# Patient Record
Sex: FEMALE | Race: ASIAN | Employment: UNEMPLOYED | URBAN - METROPOLITAN AREA
[De-identification: names, ages, dates, MRNs, and addresses within clinical notes are randomized per-mention and may not be internally consistent; named-entity substitution may affect disease eponyms.]

---

## 2021-01-17 ENCOUNTER — HOSPITAL ENCOUNTER (EMERGENCY)
Age: 26
Discharge: HOME | End: 2021-01-17
Attending: FAMILY MEDICINE
Payer: COMMERCIAL

## 2021-01-17 VITALS
OXYGEN SATURATION: 99 % | BODY MASS INDEX: 23.6 KG/M2 | TEMPERATURE: 98.1 F | HEIGHT: 61 IN | HEART RATE: 92 BPM | WEIGHT: 125 LBS | SYSTOLIC BLOOD PRESSURE: 110 MMHG | RESPIRATION RATE: 16 BRPM | DIASTOLIC BLOOD PRESSURE: 69 MMHG

## 2021-01-17 DIAGNOSIS — G44.209 ACUTE NON INTRACTABLE TENSION-TYPE HEADACHE: Primary | ICD-10-CM

## 2021-01-17 LAB
APPEARANCE UR: CLEAR
BACTERIA URNS QL MICRO: NEGATIVE /HPF
BILIRUB UR QL: NEGATIVE
COLOR UR: ABNORMAL
GLUCOSE UR STRIP.AUTO-MCNC: NEGATIVE MG/DL
HCG UR QL: NEGATIVE
HGB UR QL STRIP: NEGATIVE
KETONES UR QL STRIP.AUTO: NEGATIVE MG/DL
LEUKOCYTE ESTERASE UR QL STRIP.AUTO: NEGATIVE
NITRITE UR QL STRIP.AUTO: NEGATIVE
PH UR STRIP: 5 [PH] (ref 5–8)
PROT UR STRIP-MCNC: NEGATIVE MG/DL
RBC #/AREA URNS HPF: ABNORMAL /HPF (ref 0–5)
SP GR UR REFRACTOMETRY: 1 (ref 1–1.03)
UROBILINOGEN UR QL STRIP.AUTO: 0.1 EU/DL (ref 0.2–1)
WBC URNS QL MICRO: ABNORMAL /HPF (ref 0–4)

## 2021-01-17 PROCEDURE — 81025 URINE PREGNANCY TEST: CPT

## 2021-01-17 PROCEDURE — 96375 TX/PRO/DX INJ NEW DRUG ADDON: CPT

## 2021-01-17 PROCEDURE — 81003 URINALYSIS AUTO W/O SCOPE: CPT

## 2021-01-17 PROCEDURE — 99283 EMERGENCY DEPT VISIT LOW MDM: CPT

## 2021-01-17 PROCEDURE — 74011250636 HC RX REV CODE- 250/636: Performed by: FAMILY MEDICINE

## 2021-01-17 PROCEDURE — 96374 THER/PROPH/DIAG INJ IV PUSH: CPT

## 2021-01-17 RX ORDER — DIPHENHYDRAMINE HCL 25 MG
25 CAPSULE ORAL
Qty: 10 CAP | Refills: 0 | Status: SHIPPED | OUTPATIENT
Start: 2021-01-17 | End: 2021-01-27

## 2021-01-17 RX ORDER — ONDANSETRON 2 MG/ML
4 INJECTION INTRAMUSCULAR; INTRAVENOUS
Status: COMPLETED | OUTPATIENT
Start: 2021-01-17 | End: 2021-01-17

## 2021-01-17 RX ORDER — KETOROLAC TROMETHAMINE 15 MG/ML
15 INJECTION, SOLUTION INTRAMUSCULAR; INTRAVENOUS ONCE
Status: COMPLETED | OUTPATIENT
Start: 2021-01-17 | End: 2021-01-17

## 2021-01-17 RX ORDER — PROCHLORPERAZINE EDISYLATE 5 MG/ML
10 INJECTION INTRAMUSCULAR; INTRAVENOUS
Status: COMPLETED | OUTPATIENT
Start: 2021-01-17 | End: 2021-01-17

## 2021-01-17 RX ORDER — IBUPROFEN 800 MG/1
800 TABLET ORAL
Qty: 20 TAB | Refills: 0 | Status: SHIPPED | OUTPATIENT
Start: 2021-01-17 | End: 2021-01-24

## 2021-01-17 RX ORDER — DIPHENHYDRAMINE HYDROCHLORIDE 50 MG/ML
25 INJECTION, SOLUTION INTRAMUSCULAR; INTRAVENOUS ONCE
Status: COMPLETED | OUTPATIENT
Start: 2021-01-17 | End: 2021-01-17

## 2021-01-17 RX ADMIN — ONDANSETRON 4 MG: 2 INJECTION INTRAMUSCULAR; INTRAVENOUS at 22:29

## 2021-01-17 RX ADMIN — DIPHENHYDRAMINE HYDROCHLORIDE 25 MG: 50 INJECTION, SOLUTION INTRAMUSCULAR; INTRAVENOUS at 22:28

## 2021-01-17 RX ADMIN — PROCHLORPERAZINE EDISYLATE 10 MG: 5 INJECTION INTRAMUSCULAR; INTRAVENOUS at 22:29

## 2021-01-17 RX ADMIN — SODIUM CHLORIDE 1000 ML: 9 INJECTION, SOLUTION INTRAVENOUS at 22:29

## 2021-01-17 RX ADMIN — KETOROLAC TROMETHAMINE 15 MG: 15 INJECTION, SOLUTION INTRAMUSCULAR; INTRAVENOUS at 22:28

## 2021-01-18 NOTE — ED TRIAGE NOTES
Patient arrives with sister, Luís Ackerman, who is translating from [place] to [place] for her; although, the patient is responding appropriately to questions in [place]. Complaining headache x1 year, nausea, malaise and difficulty sleeping. LMP 1/3/21.

## 2021-01-18 NOTE — DISCHARGE INSTRUCTIONS
Thank you! Thank you for allowing me to care for you in the emergency department. I sincerely hope that you are satisfied with your visit today. It is my goal to provide you with excellent care. Below you will find a list of your labs and imaging from your visit today. Should you have any questions regarding these results please do not hesitate to call the emergency department. Labs -     Recent Results (from the past 12 hour(s))   HCG URINE, QL    Collection Time: 01/17/21 10:05 PM   Result Value Ref Range    HCG urine, QL Negative Negative     URINALYSIS W/ RFLX MICROSCOPIC    Collection Time: 01/17/21 10:18 PM   Result Value Ref Range    Color Yellow/Straw      Appearance Clear Clear      Specific gravity 1.005 1.003 - 1.030      pH (UA) 5.0 5.0 - 8.0      Protein Negative Negative mg/dL    Glucose Negative Negative mg/dL    Ketone Negative Negative mg/dL    Bilirubin Negative Negative      Blood Negative Negative      Urobilinogen 0.1 (L) 0.2 - 1.0 EU/dL    Nitrites Negative Negative      Leukocyte Esterase Negative Negative      WBC 0-4 0 - 4 /hpf    RBC 0-5 0 - 5 /hpf    Bacteria Negative Negative /hpf       Radiologic Studies -   No orders to display     CT Results  (Last 48 hours)      None          CXR Results  (Last 48 hours)      None               If you feel that you have not received excellent quality care or timely care, please ask to speak to the nurse manager. Please choose us in the future for your continued health care needs. ------------------------------------------------------------------------------------------------------------  The exam and treatment you received in the Emergency Department were for an urgent problem and are not intended as complete care. It is important that you follow-up with a doctor, nurse practitioner, or physician assistant to:  (1) confirm your diagnosis,  (2) re-evaluation of changes in your illness and treatment, and  (3) for ongoing care.   If your symptoms become worse or you do not improve as expected and you are unable to reach your usual health care provider, you should return to the Emergency Department. We are available 24 hours a day. Please take your discharge instructions with you when you go to your follow-up appointment. If you have any problem arranging a follow-up appointment, contact the Emergency Department immediately. If a prescription has been provided, please have it filled as soon as possible to prevent a delay in treatment. Read the entire medication instruction sheet provided to you by the pharmacy. If you have any questions or reservations about taking the medication due to side effects or interactions with other medications, please call your primary care physician or contact the ER to speak with the charge nurse. Make an appointment with your family doctor or the physician you were referred to for follow-up of this visit as instructed on your discharge paperwork, as this is a mandatory follow-up. Return to the ER if you are unable to be seen or if you are unable to be seen in a timely manner. If you have any problem arranging the follow-up visit, contact the Emergency Department immediately.

## 2021-01-18 NOTE — ED PROVIDER NOTES
EMERGENCY DEPARTMENT HISTORY AND PHYSICAL EXAM      Date: 1/17/2021  Patient Name: Humza Paniagua    History of Presenting Illness     Chief Complaint   Patient presents with    Headache    Nausea       History Provided By: Patient and patient's sister    HPI: Humza Paniagua, 23 y.o. female with a past medical history significant as documented below presents to the ED with cc of headache and nausea. Onset 1 week. Patient describing bandlike headache across the front forehead that has been constant and stable since onset. Is nonradiating. She has taken OTC ibuprofen every night is not providing relief. She had a year of on and off headaches with sensitivity to light and sound and nausea but no vomiting. The headache today has been the same as previous headaches however has lasted longer than normal.  No head injuries, LOC, dizziness, blurred vision, ringing in the ears, neck pain or stiffness, dyspnea, sore throat, nasal congestion or sinus pressure, earache, chest pain, palpitations, and all other symptoms are negative. She has never had her headaches evaluated by a neurologist or physician in the past.  She admits she has been feeling a lot more stress lately since she moved to this area and is having difficulty sleeping. He has been able to eat and drink without difficulty or reducing vomiting, last meal was this morning. No thoughts of hurting herself or others. There are no other complaints, changes, or physical findings at this time. PCP: None    No current facility-administered medications on file prior to encounter. No current outpatient medications on file prior to encounter. Past History     Past Medical History:  No past medical history on file. Past Surgical History:  No past surgical history on file. Family History:  No family history on file.     Social History:  Social History     Tobacco Use    Smoking status: Not on file   Substance Use Topics    Alcohol use: Not on file  Drug use: Not on file       Allergies:  No Known Allergies      Review of Systems     Review of Systems   Constitutional: Negative for chills, fatigue and fever. HENT: Negative for congestion and sore throat. Eyes: Negative for photophobia and visual disturbance. Respiratory: Negative for cough and shortness of breath. Cardiovascular: Negative for chest pain and palpitations. Gastrointestinal: Negative for nausea and vomiting. Genitourinary: Negative for dysuria and flank pain. Musculoskeletal: Negative for arthralgias, back pain and myalgias. Skin: Negative for rash and wound. Allergic/Immunologic: Negative for environmental allergies and food allergies. Neurological: Positive for headaches. Negative for dizziness, tremors, syncope, facial asymmetry, speech difficulty, weakness and light-headedness. All other systems reviewed and are negative. Physical Exam     Physical Exam  Vitals signs and nursing note reviewed. Constitutional:       Appearance: Normal appearance. She is normal weight. HENT:      Head: Normocephalic and atraumatic. Right Ear: Tympanic membrane, ear canal and external ear normal.      Left Ear: Tympanic membrane, ear canal and external ear normal.      Nose: Nose normal.      Mouth/Throat:      Mouth: Mucous membranes are moist.   Eyes:      Extraocular Movements: Extraocular movements intact. Conjunctiva/sclera: Conjunctivae normal.   Neck:      Musculoskeletal: Normal range of motion and neck supple. No muscular tenderness. Cardiovascular:      Rate and Rhythm: Normal rate and regular rhythm. Pulses: Normal pulses. Heart sounds: Normal heart sounds. Pulmonary:      Effort: Pulmonary effort is normal.      Breath sounds: Normal breath sounds. Musculoskeletal: Normal range of motion. Right lower leg: No edema. Left lower leg: No edema. Lymphadenopathy:      Cervical: No cervical adenopathy.    Skin:     General: Skin is warm.      Capillary Refill: Capillary refill takes less than 2 seconds. Neurological:      General: No focal deficit present. Mental Status: She is alert and oriented to person, place, and time. Cranial Nerves: No cranial nerve deficit. Sensory: No sensory deficit. Motor: No weakness. Gait: Gait normal.   Psychiatric:         Mood and Affect: Mood normal.         Behavior: Behavior normal.         Lab and Diagnostic Study Results     Labs -     Recent Results (from the past 12 hour(s))   HCG URINE, QL    Collection Time: 01/17/21 10:05 PM   Result Value Ref Range    HCG urine, QL Negative Negative     URINALYSIS W/ RFLX MICROSCOPIC    Collection Time: 01/17/21 10:18 PM   Result Value Ref Range    Color Yellow/Straw      Appearance Clear Clear      Specific gravity 1.005 1.003 - 1.030      pH (UA) 5.0 5.0 - 8.0      Protein Negative Negative mg/dL    Glucose Negative Negative mg/dL    Ketone Negative Negative mg/dL    Bilirubin Negative Negative      Blood Negative Negative      Urobilinogen 0.1 (L) 0.2 - 1.0 EU/dL    Nitrites Negative Negative      Leukocyte Esterase Negative Negative      WBC 0-4 0 - 4 /hpf    RBC 0-5 0 - 5 /hpf    Bacteria Negative Negative /hpf       Radiologic Studies -   @lastxrresult@  CT Results  (Last 48 hours)    None        CXR Results  (Last 48 hours)    None            Medical Decision Making   - I am the first provider for this patient. - I reviewed the vital signs, available nursing notes, past medical history, past surgical history, family history and social history. - Initial assessment performed. The patients presenting problems have been discussed, and they are in agreement with the care plan formulated and outlined with them. I have encouraged them to ask questions as they arise throughout their visit. Vital Signs-Reviewed the patient's vital signs.   Patient Vitals for the past 12 hrs:   Temp Pulse Resp BP SpO2   01/17/21 2243  92 16 110/69 99 %   01/17/21 2203 98.1 °F (36.7 °C) 92 16 112/78 99 %       Records Reviewed: Nursing Notes    ED Course:          Provider Notes (Medical Decision Making):     MDM  Number of Diagnoses or Management Options  Acute non intractable tension-type headache  Diagnosis management comments: Headache resolved with IV fluids, and headache cocktail treatment. Per H&P, is likely patient had a tension headache. Patient is stable with no marked toxicity or distress. No significant findings on physical exam. Results reviewed with the patient. Recommended to follow-up with the primary care doctor to further get treatment for insomnia and general stress in the meantime to take Benadryl to help with sleep. All questions were answered and there are no apparent barriers to comprehension or communication. The patient verbalizes agreement with the diagnosis, treatment plan, and understanding of the follow-up instructions. The patient is appropriate for discharge; leaves the Emergency Department walking with a stable gait. Patient understands to return to the ED in 12-24 hours for any new or worsening symptoms. Disposition   Disposition: Condition stable  DC- Adult Discharges: All of the diagnostic tests were reviewed and questions answered. Diagnosis, care plan and treatment options were discussed. The patient understands the instructions and will follow up as directed. The patients results have been reviewed with them. They have been counseled regarding their diagnosis. The patient verbally convey understanding and agreement of the signs, symptoms, diagnosis, treatment and prognosis and additionally agrees to follow up as recommended with their PCP in 24 - 48 hours. They also agree with the care-plan and convey that all of their questions have been answered.   I have also put together some discharge instructions for them that include: 1) educational information regarding their diagnosis, 2) how to care for their diagnosis at home, as well a 3) list of reasons why they would want to return to the ED prior to their follow-up appointment, should their condition change. DISCHARGE PLAN:  1. There are no discharge medications for this patient. 2.   Follow-up Information     Follow up With Specialties Details Why Dyke Buerger, MD [facility] Schedule an appointment as soon as possible for a visit in 3 days  [address]  [phone]          3. Return to ED if worse   4. Current Discharge Medication List      START taking these medications    Details   diphenhydrAMINE (BenadryL) 25 mg capsule Take 1 Cap by mouth nightly as needed for Sleep for up to 10 days. Qty: 10 Cap, Refills: 0      ibuprofen (MOTRIN) 800 mg tablet Take 1 Tab by mouth every six (6) hours as needed for Pain for up to 7 days. Qty: 20 Tab, Refills: 0               Diagnosis     Clinical Impression:   1. Acute non intractable tension-type headache        Attestations:    Lauren Mandel, DO    Please note that this dictation was completed with [employer], the computer voice recognition software. Quite often unanticipated grammatical, syntax, homophones, and other interpretive errors are inadvertently transcribed by the computer software. Please disregard these errors. Please excuse any errors that have escaped final proofreading. Thank you.

## 2023-08-15 ENCOUNTER — INITIAL PRENATAL (OUTPATIENT)
Age: 28
End: 2023-08-15

## 2023-08-15 VITALS
WEIGHT: 147.31 LBS | BODY MASS INDEX: 27.81 KG/M2 | RESPIRATION RATE: 16 BRPM | DIASTOLIC BLOOD PRESSURE: 70 MMHG | SYSTOLIC BLOOD PRESSURE: 119 MMHG | HEIGHT: 61 IN | TEMPERATURE: 97.8 F | OXYGEN SATURATION: 100 % | HEART RATE: 103 BPM

## 2023-08-15 DIAGNOSIS — Z34.90 PRENATAL CARE, ANTEPARTUM: Primary | ICD-10-CM

## 2023-08-15 DIAGNOSIS — Z34.90 PRENATAL CARE, ANTEPARTUM: ICD-10-CM

## 2023-08-15 DIAGNOSIS — N89.8 VAGINAL DISCHARGE: ICD-10-CM

## 2023-08-15 DIAGNOSIS — Z3A.18 18 WEEKS GESTATION OF PREGNANCY: ICD-10-CM

## 2023-08-15 RX ORDER — PNV NO.95/FERROUS FUM/FOLIC AC 28MG-0.8MG
1 TABLET ORAL DAILY
Qty: 90 TABLET | Refills: 5 | Status: SHIPPED | OUTPATIENT
Start: 2023-08-15

## 2023-08-16 LAB
25(OH)D3+25(OH)D2 SERPL-MCNC: 13 NG/ML (ref 30–100)
ABO GROUP BLD: NORMAL
BASOPHILS # BLD AUTO: 0 X10E3/UL (ref 0–0.2)
BASOPHILS NFR BLD AUTO: 0 %
BLD GP AB SCN SERPL QL: NEGATIVE
EOSINOPHIL # BLD AUTO: 0.3 X10E3/UL (ref 0–0.4)
EOSINOPHIL NFR BLD AUTO: 3 %
ERYTHROCYTE [DISTWIDTH] IN BLOOD BY AUTOMATED COUNT: 13.1 % (ref 11.7–15.4)
HBV SURFACE AG SERPL QL IA: NEGATIVE
HCT VFR BLD AUTO: 33 % (ref 34–46.6)
HCV IGG SERPL QL IA: NON REACTIVE
HGB BLD-MCNC: 11.1 G/DL (ref 11.1–15.9)
HIV 1+2 AB+HIV1 P24 AG SERPL QL IA: NON REACTIVE
IMM GRANULOCYTES # BLD AUTO: 0 X10E3/UL (ref 0–0.1)
IMM GRANULOCYTES NFR BLD AUTO: 0 %
LYMPHOCYTES # BLD AUTO: 2.1 X10E3/UL (ref 0.7–3.1)
LYMPHOCYTES NFR BLD AUTO: 20 %
MCH RBC QN AUTO: 29.1 PG (ref 26.6–33)
MCHC RBC AUTO-ENTMCNC: 33.6 G/DL (ref 31.5–35.7)
MCV RBC AUTO: 86 FL (ref 79–97)
MONOCYTES # BLD AUTO: 0.6 X10E3/UL (ref 0.1–0.9)
MONOCYTES NFR BLD AUTO: 6 %
NEUTROPHILS # BLD AUTO: 7.2 X10E3/UL (ref 1.4–7)
NEUTROPHILS NFR BLD AUTO: 71 %
PLATELET # BLD AUTO: 270 X10E3/UL (ref 150–450)
RBC # BLD AUTO: 3.82 X10E6/UL (ref 3.77–5.28)
RH BLD: POSITIVE
TREPONEMA PALLIDUM IGG+IGM AB [PRESENCE] IN SERUM OR PLASMA BY IMMUNOASSAY: NON REACTIVE
TSH SERPL DL<=0.005 MIU/L-ACNC: 0.69 UIU/ML (ref 0.45–4.5)
WBC # BLD AUTO: 10.2 X10E3/UL (ref 3.4–10.8)

## 2023-08-17 LAB
2ND TRIMESTER 4 SCREEN SERPL-IMP: NORMAL
AFP ADJ MOM SERPL: 0.77
AFP SERPL-MCNC: 35.1 NG/ML
AGE AT DELIVERY: 26.1 YR
FET TS 18 RISK FROM MAT AGE: NORMAL
FET TS 21 RISK FROM MAT AGE: 977
GA METHOD: NORMAL
GA: 18 WEEKS
HCG ADJ MOM SERPL: 0.85
HCG SERPL-ACNC: NORMAL MIU/ML
HGB A MFR BLD ELPH: 96.9 % (ref 96.4–98.8)
HGB A2 MFR BLD ELPH: 3.1 % (ref 1.8–3.2)
HGB F MFR BLD ELPH: 0 % (ref 0–2)
HGB FRACT BLD-IMP: NORMAL
HGB S MFR BLD ELPH: 0 %
IDDM PATIENT QL: NO
INHIBIN A ADJ MOM SERPL: 0.67
INHIBIN A SERPL-MCNC: 103.9 PG/ML
MULTIPLE PREGNANCY: NO
NEURAL TUBE DEFECT RISK FETUS: NORMAL %
SERVICE CMNT-IMP: NORMAL
TS 18 RISK FETUS: NORMAL
TS 21 RISK FETUS: 9026
U ESTRIOL ADJ MOM SERPL: 0.88
U ESTRIOL SERPL-MCNC: 1.28 NG/ML

## 2023-08-18 LAB
A VAGINAE DNA VAG QL NAA+PROBE: ABNORMAL SCORE
BVAB2 DNA VAG QL NAA+PROBE: ABNORMAL SCORE
C ALBICANS DNA VAG QL NAA+PROBE: NEGATIVE
C GLABRATA DNA VAG QL NAA+PROBE: NEGATIVE
C TRACH DNA VAG QL NAA+PROBE: NEGATIVE
MEGA1 DNA VAG QL NAA+PROBE: ABNORMAL SCORE
N GONORRHOEA DNA VAG QL NAA+PROBE: NEGATIVE
T VAGINALIS DNA VAG QL NAA+PROBE: NEGATIVE

## 2023-08-21 LAB
CFTR MUT ANL BLD/T: NORMAL
GENE DIS ANL CARRIER INTERP-IMP: NORMAL

## 2023-08-22 LAB
CMV IGG SERPL IA-ACNC: >10 U/ML (ref 0–0.59)
CMV IGM SERPL IA-ACNC: <30 AU/ML (ref 0–29.9)
RUBV IGG SERPL IA-ACNC: 10 INDEX

## 2023-08-23 ENCOUNTER — HOSPITAL ENCOUNTER (OUTPATIENT)
Facility: HOSPITAL | Age: 28
Discharge: HOME | End: 2023-08-26
Attending: OBSTETRICS & GYNECOLOGY
Payer: COMMERCIAL

## 2023-08-23 DIAGNOSIS — N89.8 VAGINAL DISCHARGE: ICD-10-CM

## 2023-08-23 LAB
CFDNA.FET/CFDNA.TOTAL SFR FETUS: NORMAL %
CITATION REF LAB TEST: NORMAL
FET 13+18+21+X+Y ANEUP PLAS.CFDNA: NEGATIVE
FET CHR 21 TS PLAS.CFDNA QL: NEGATIVE
FET MS X RISK WBC.DNA+CFDNA QL: NOT DETECTED
FET SEX PLAS.CFDNA DOSAGE CFDNA: NORMAL
FET TS 13 RISK PLAS.CFDNA QL: NEGATIVE
FET TS 18 RISK WBC.DNA+CFDNA QL: NEGATIVE
FET X + Y ANEUP RISK PLAS.CFDNA SEQ-IMP: NOT DETECTED
GA EST FROM CONCEPTION DATE: NORMAL D
GESTATIONAL AGE > OR = 9 WEEKS: YES
LAB DIRECTOR NAME PROVIDER: NORMAL
LAB DIRECTOR NAME PROVIDER: NORMAL
LABORATORY COMMENT REPORT: NORMAL
LIMITATIONS OF THE TEST: NORMAL
Lab: NORMAL
NEGATIVE PREDICTIVE VALUE: NORMAL
PERFORMANCE CHARACTERISTICS: NORMAL
POSITIVE PREDICTIVE VALUE: NORMAL
REF LAB TEST METHOD: NORMAL
TEST PERFORMANCE INFO SPEC: NORMAL

## 2023-08-23 PROCEDURE — 76805 OB US >/= 14 WKS SNGL FETUS: CPT

## 2023-08-25 LAB
ABDOMINAL CIRCUMFERENCE: 13.5 CM
ABDOMINAL CIRCUMFERENCE: 13.54 CM
ABDOMINAL CIRCUMFERENCE: 13.58 CM
BIPARIETAL DIAMETER: 4.55 CM
BIPARIETAL DIAMETER: 4.55 CM
HEAD CIRCUMFERENCE: 16.21 CM
HEAD CIRCUMFERENCE: 16.21 CM

## 2023-09-14 SDOH — ECONOMIC STABILITY: INCOME INSECURITY: HOW HARD IS IT FOR YOU TO PAY FOR THE VERY BASICS LIKE FOOD, HOUSING, MEDICAL CARE, AND HEATING?: PATIENT DECLINED

## 2023-09-14 SDOH — ECONOMIC STABILITY: HOUSING INSECURITY
IN THE LAST 12 MONTHS, WAS THERE A TIME WHEN YOU DID NOT HAVE A STEADY PLACE TO SLEEP OR SLEPT IN A SHELTER (INCLUDING NOW)?: NO

## 2023-09-14 SDOH — ECONOMIC STABILITY: FOOD INSECURITY: WITHIN THE PAST 12 MONTHS, YOU WORRIED THAT YOUR FOOD WOULD RUN OUT BEFORE YOU GOT MONEY TO BUY MORE.: PATIENT DECLINED

## 2023-09-14 SDOH — ECONOMIC STABILITY: TRANSPORTATION INSECURITY
IN THE PAST 12 MONTHS, HAS LACK OF TRANSPORTATION KEPT YOU FROM MEETINGS, WORK, OR FROM GETTING THINGS NEEDED FOR DAILY LIVING?: NO

## 2023-09-14 SDOH — ECONOMIC STABILITY: FOOD INSECURITY: WITHIN THE PAST 12 MONTHS, THE FOOD YOU BOUGHT JUST DIDN'T LAST AND YOU DIDN'T HAVE MONEY TO GET MORE.: PATIENT DECLINED

## 2023-09-15 ENCOUNTER — ROUTINE PRENATAL (OUTPATIENT)
Age: 28
End: 2023-09-15

## 2023-09-15 VITALS
SYSTOLIC BLOOD PRESSURE: 101 MMHG | WEIGHT: 153 LBS | DIASTOLIC BLOOD PRESSURE: 62 MMHG | BODY MASS INDEX: 28.89 KG/M2 | OXYGEN SATURATION: 100 % | HEIGHT: 61 IN | RESPIRATION RATE: 16 BRPM | HEART RATE: 94 BPM

## 2023-09-15 DIAGNOSIS — Z87.891 HISTORY OF NICOTINE VAPING: ICD-10-CM

## 2023-09-15 DIAGNOSIS — Z34.90 PRENATAL CARE, ANTEPARTUM: Primary | ICD-10-CM

## 2023-09-15 DIAGNOSIS — Z3A.22 22 WEEKS GESTATION OF PREGNANCY: ICD-10-CM

## 2023-09-15 RX ORDER — DOCUSATE SODIUM 100 MG/1
100 CAPSULE, LIQUID FILLED ORAL DAILY PRN
Qty: 60 CAPSULE | Refills: 0 | Status: SHIPPED | OUTPATIENT
Start: 2023-09-15

## 2023-10-12 ENCOUNTER — ROUTINE PRENATAL (OUTPATIENT)
Age: 28
End: 2023-10-12

## 2023-10-12 VITALS
OXYGEN SATURATION: 99 % | RESPIRATION RATE: 16 BRPM | WEIGHT: 158 LBS | SYSTOLIC BLOOD PRESSURE: 128 MMHG | DIASTOLIC BLOOD PRESSURE: 72 MMHG | HEIGHT: 61 IN | HEART RATE: 110 BPM | BODY MASS INDEX: 29.83 KG/M2

## 2023-10-12 DIAGNOSIS — Z34.90 PRENATAL CARE, ANTEPARTUM: Primary | ICD-10-CM

## 2023-10-12 DIAGNOSIS — Z87.891 HISTORY OF NICOTINE VAPING: ICD-10-CM

## 2023-10-12 DIAGNOSIS — Z3A.26 26 WEEKS GESTATION OF PREGNANCY: ICD-10-CM

## 2023-10-12 PROCEDURE — 0502F SUBSEQUENT PRENATAL CARE: CPT | Performed by: OBSTETRICS & GYNECOLOGY

## 2023-10-12 NOTE — PATIENT INSTRUCTIONS
Patient Education        Weeks 26 to 30 of Your Pregnancy: Care Instructions  You're starting your last trimester. Waylan Done probably feel your baby moving around more. Your back may ache as your body gets used to your baby's size and length. Take care of yourself, and pay attention to what your body needs. Talk to your doctor about getting the Tdap shot. It will help protect your  against whooping cough (pertussis). Also ask your doctor about flu and COVID-19 shots if you haven't had them yet. If your blood type is Rh negative, you may be given a shot of Rh immune globulin (such as RhoGAM). It can help prevent problems for your baby. You may have [place] contractions. They are single or several strong contractions without a pattern. These are practice contractions but not the start of labor. Be kind to yourself. Take breaks when you're tired. Change positions often. Don't sit for too long or stand for too long. At work, rest during breaks if you can. If you don't get breaks, talk to your doctor about writing a letter to your employer to request them. Avoid fumes, chemicals, and tobacco smoke. Be sexual if you want to. You may be interested in sex, or you may not. Everyone is different. Sex is okay unless your doctor tells you not to. Your belly can make it hard to find good positions for sex. [place] and explore. Watch for signs of  labor. These signs include:   Menstrual-like cramps. Or you may have pain or pressure in your pelvis that happens in a pattern. About 6 or more contractions in an hour (even after rest and a glass of water). A low, dull backache that doesn't go away when you change positions. An increase or change in vaginal discharge. Light vaginal bleeding or spotting. Your water breaking. Know what to do if you think you are having contractions. Drink 1 or 2 glasses of water. Lie down on your left side for at least an hour.   While on your side,

## 2023-10-23 ENCOUNTER — ROUTINE PRENATAL (OUTPATIENT)
Age: 28
End: 2023-10-23

## 2023-10-23 VITALS
RESPIRATION RATE: 18 BRPM | DIASTOLIC BLOOD PRESSURE: 68 MMHG | HEIGHT: 61 IN | WEIGHT: 161 LBS | HEART RATE: 103 BPM | OXYGEN SATURATION: 99 % | BODY MASS INDEX: 30.4 KG/M2 | SYSTOLIC BLOOD PRESSURE: 110 MMHG

## 2023-10-23 DIAGNOSIS — Z34.90 PRENATAL CARE, ANTEPARTUM: Primary | ICD-10-CM

## 2023-10-23 DIAGNOSIS — Z3A.28 28 WEEKS GESTATION OF PREGNANCY: ICD-10-CM

## 2023-10-23 PROCEDURE — 0502F SUBSEQUENT PRENATAL CARE: CPT | Performed by: OBSTETRICS & GYNECOLOGY

## 2023-10-23 NOTE — PROGRESS NOTES
Johnella Severs is a 24 y.o. female 31w0d        OB History    Para Term  AB Living   2       1 0   SAB IAB Ectopic Molar Multiple Live Births   1         0      # Outcome Date GA Lbr Bonilla/2nd Weight Sex Delivery Anes PTL Lv   2 Current            1 SAB                  Mother's Prenatal Vitals  BP: 110/68  Weight - Scale: 73 kg (161 lb)  Pulse: (!) 103      The patient was seen and evaluated. There was positive fetal movements. No contractions or leakage of fluid. Signs and symptoms of  labor as well as labor were reviewed. The S/S of Pre-Eclampsia were reviewed with the patient in detail. She is to report any of these if they occur. She currently denies any of these. The patient had her 28 week labs in process. T-Dap Vaccine (27-36 weeks): awaiting    The patient was instructed on fetal kick counts and was given a kick sheet to complete every 8 hours. She was instructed that the baby should move at a minimum of ten times within one hour after a meal. The patient was instructed to lay down on her left side twenty minutes after eating and count movements for up to one hour with a target value of ten movements. She was instructed to notify the office if she did not make that target after two attempts or if after any attempt there was less than four movements. The patient reports that the targets have been made Yes. Assessment:  1. Johnella Severs is a 24 y.o. female  2.   3. 28w0d    There are no problems to display for this patient. Diagnosis Orders   1. Prenatal care, antepartum        2. 28 weeks gestation of pregnancy                  Plan:  The patient will return to the office for her next visit in 2 weeks. See antepartum flow sheet.

## 2023-10-24 LAB
ERYTHROCYTE [DISTWIDTH] IN BLOOD BY AUTOMATED COUNT: 12.2 % (ref 11.7–15.4)
GLUCOSE 1H P 50 G GLC PO SERPL-MCNC: 151 MG/DL (ref 70–139)
HCT VFR BLD AUTO: 30.9 % (ref 34–46.6)
HGB BLD-MCNC: 10.3 G/DL (ref 11.1–15.9)
MCH RBC QN AUTO: 29.2 PG (ref 26.6–33)
MCHC RBC AUTO-ENTMCNC: 33.3 G/DL (ref 31.5–35.7)
MCV RBC AUTO: 88 FL (ref 79–97)
PLATELET # BLD AUTO: 252 X10E3/UL (ref 150–450)
RBC # BLD AUTO: 3.53 X10E6/UL (ref 3.77–5.28)
WBC # BLD AUTO: 9.9 X10E3/UL (ref 3.4–10.8)

## 2023-10-31 NOTE — PROGRESS NOTES
MARK visit  Doing well  Good fetal movement  States she has stopped vaping  No PTL symptoms  To complete glucoa

## 2023-11-06 ENCOUNTER — ROUTINE PRENATAL (OUTPATIENT)
Age: 28
End: 2023-11-06

## 2023-11-06 VITALS
BODY MASS INDEX: 31.15 KG/M2 | RESPIRATION RATE: 16 BRPM | SYSTOLIC BLOOD PRESSURE: 116 MMHG | DIASTOLIC BLOOD PRESSURE: 68 MMHG | WEIGHT: 165 LBS | HEIGHT: 61 IN

## 2023-11-06 DIAGNOSIS — O99.019 ANEMIA DURING PREGNANCY: ICD-10-CM

## 2023-11-06 DIAGNOSIS — R12 HEARTBURN: ICD-10-CM

## 2023-11-06 DIAGNOSIS — O99.810 ABNORMAL GLUCOSE AFFECTING PREGNANCY: ICD-10-CM

## 2023-11-06 DIAGNOSIS — Z34.90 PRENATAL CARE, ANTEPARTUM: Primary | ICD-10-CM

## 2023-11-06 DIAGNOSIS — Z3A.30 30 WEEKS GESTATION OF PREGNANCY: ICD-10-CM

## 2023-11-06 PROCEDURE — 0502F SUBSEQUENT PRENATAL CARE: CPT | Performed by: OBSTETRICS & GYNECOLOGY

## 2023-11-06 RX ORDER — FERROUS SULFATE 325(65) MG
325 TABLET ORAL 2 TIMES DAILY
Qty: 180 TABLET | Refills: 1 | Status: SHIPPED | OUTPATIENT
Start: 2023-11-06

## 2023-11-06 RX ORDER — OMEPRAZOLE 20 MG/1
20 CAPSULE, DELAYED RELEASE ORAL
Qty: 30 CAPSULE | Refills: 0 | Status: SHIPPED | OUTPATIENT
Start: 2023-11-06

## 2023-11-06 NOTE — PROGRESS NOTES
Charity Leonardo is a 24 y.o. female 30w0d        OB History    Para Term  AB Living   2       1 0   SAB IAB Ectopic Molar Multiple Live Births   1         0      # Outcome Date GA Lbr Bonilla/2nd Weight Sex Delivery Anes PTL Lv   2 Current            1 SAB                  Mother's Prenatal Vitals  BP: 116/68  Weight - Scale: 74.8 kg (165 lb)  Alb/Glu  Albumin: Negative  Glucose: Negative  Prenatal Fetal Information  Fetal HR: 141  Movement: Present  Presentation: Vertex      The patient was seen and evaluated. There was positive fetal movements. No contractions or leakage of fluid. Signs and symptoms of  labor as well as labor were reviewed. The S/S of Pre-Eclampsia were reviewed with the patient in detail. She is to report any of these if they occur. She currently denies any of these. The patient was instructed on fetal kick counts and was given a kick sheet to complete every 8 hours. She was instructed that the baby should move at a minimum of ten times within one hour after a meal. The patient was instructed to lay down on her left side twenty minutes after eating and count movements for up to one hour with a target value of ten movements. She was instructed to notify the office if she did not make that target after two attempts or if after any attempt there was less than four movements. The patient reports that the targets have been made Yes. Assessment:  1. Charity Leonardo is a 24 y.o. female  2.   3. 30w0d    There are no problems to display for this patient. Diagnosis Orders   1. Prenatal care, antepartum        2. Abnormal glucose affecting pregnancy  Gestational GTT, 3 HR      3. 30 weeks gestation of pregnancy        4. Heartburn  omeprazole (PRILOSEC) 20 MG delayed release capsule      5. Anemia during pregnancy  ferrous sulfate (IRON 325) 325 (65 Fe) MG tablet                Plan:  The patient will return to the office for her next visit in 2 weeks.  See antepartum

## 2023-11-11 LAB
GLUCOSE 1H P 100 G GLC PO SERPL-MCNC: 192 MG/DL (ref 70–179)
GLUCOSE 2H P 100 G GLC PO SERPL-MCNC: 186 MG/DL (ref 70–154)
GLUCOSE 3H P 100 G GLC PO SERPL-MCNC: 156 MG/DL (ref 70–139)
GLUCOSE P FAST SERPL-MCNC: 81 MG/DL (ref 70–94)
Lab: ABNORMAL

## 2023-11-20 ENCOUNTER — ROUTINE PRENATAL (OUTPATIENT)
Age: 28
End: 2023-11-20

## 2023-11-20 VITALS
DIASTOLIC BLOOD PRESSURE: 67 MMHG | TEMPERATURE: 97.8 F | SYSTOLIC BLOOD PRESSURE: 114 MMHG | WEIGHT: 167.25 LBS | OXYGEN SATURATION: 98 % | BODY MASS INDEX: 31.58 KG/M2 | HEIGHT: 61 IN | HEART RATE: 106 BPM | RESPIRATION RATE: 16 BRPM

## 2023-11-20 DIAGNOSIS — Z87.891 HISTORY OF NICOTINE VAPING: ICD-10-CM

## 2023-11-20 DIAGNOSIS — O24.419 GESTATIONAL DIABETES MELLITUS (GDM) IN THIRD TRIMESTER, GESTATIONAL DIABETES METHOD OF CONTROL UNSPECIFIED: Primary | ICD-10-CM

## 2023-11-20 DIAGNOSIS — Z3A.32 32 WEEKS GESTATION OF PREGNANCY: ICD-10-CM

## 2023-11-20 DIAGNOSIS — K59.00 CONSTIPATION, UNSPECIFIED CONSTIPATION TYPE: ICD-10-CM

## 2023-11-20 PROCEDURE — 0502F SUBSEQUENT PRENATAL CARE: CPT | Performed by: OBSTETRICS & GYNECOLOGY

## 2023-11-20 RX ORDER — DOCUSATE SODIUM 100 MG/1
100 CAPSULE, LIQUID FILLED ORAL 2 TIMES DAILY
Qty: 60 CAPSULE | Refills: 0 | Status: SHIPPED | OUTPATIENT
Start: 2023-11-20 | End: 2023-12-20

## 2023-11-22 NOTE — PROGRESS NOTES
MARK visit  Good fetal movement  GDM as patient has failed 3hr GTT  MFM referral  Precautions  Risk of GDM reviewed and discussed with patient  Precautions  Fetal kick counts

## 2023-12-12 ENCOUNTER — ROUTINE PRENATAL (OUTPATIENT)
Age: 28
End: 2023-12-12

## 2023-12-12 VITALS
BODY MASS INDEX: 32.76 KG/M2 | WEIGHT: 173.5 LBS | DIASTOLIC BLOOD PRESSURE: 72 MMHG | RESPIRATION RATE: 16 BRPM | HEIGHT: 61 IN | HEART RATE: 96 BPM | OXYGEN SATURATION: 97 % | SYSTOLIC BLOOD PRESSURE: 105 MMHG

## 2023-12-12 DIAGNOSIS — O24.419 GESTATIONAL DIABETES MELLITUS (GDM) IN THIRD TRIMESTER, GESTATIONAL DIABETES METHOD OF CONTROL UNSPECIFIED: ICD-10-CM

## 2023-12-12 DIAGNOSIS — Z34.83 PRENATAL CARE, SUBSEQUENT PREGNANCY IN THIRD TRIMESTER: Primary | ICD-10-CM

## 2023-12-12 DIAGNOSIS — Z3A.35 35 WEEKS GESTATION OF PREGNANCY: ICD-10-CM

## 2023-12-12 PROCEDURE — 0502F SUBSEQUENT PRENATAL CARE: CPT | Performed by: OBSTETRICS & GYNECOLOGY

## 2023-12-15 LAB — GP B STREP DNA SPEC QL NAA+PROBE: NEGATIVE

## 2023-12-16 LAB
BASOPHILS # BLD AUTO: 0 X10E3/UL (ref 0–0.2)
BASOPHILS NFR BLD AUTO: 0 %
EOSINOPHIL # BLD AUTO: 0.2 X10E3/UL (ref 0–0.4)
EOSINOPHIL NFR BLD AUTO: 2 %
ERYTHROCYTE [DISTWIDTH] IN BLOOD BY AUTOMATED COUNT: 12.1 % (ref 11.7–15.4)
HCT VFR BLD AUTO: 34.7 % (ref 34–46.6)
HGB BLD-MCNC: 11.2 G/DL (ref 11.1–15.9)
HIV 1+2 AB+HIV1 P24 AG SERPL QL IA: NON REACTIVE
IMM GRANULOCYTES # BLD AUTO: 0.1 X10E3/UL (ref 0–0.1)
IMM GRANULOCYTES NFR BLD AUTO: 1 %
LYMPHOCYTES # BLD AUTO: 2.2 X10E3/UL (ref 0.7–3.1)
LYMPHOCYTES NFR BLD AUTO: 24 %
MCH RBC QN AUTO: 28.1 PG (ref 26.6–33)
MCHC RBC AUTO-ENTMCNC: 32.3 G/DL (ref 31.5–35.7)
MCV RBC AUTO: 87 FL (ref 79–97)
MONOCYTES # BLD AUTO: 0.5 X10E3/UL (ref 0.1–0.9)
MONOCYTES NFR BLD AUTO: 6 %
NEUTROPHILS # BLD AUTO: 6.1 X10E3/UL (ref 1.4–7)
NEUTROPHILS NFR BLD AUTO: 67 %
PLATELET # BLD AUTO: 258 X10E3/UL (ref 150–450)
RBC # BLD AUTO: 3.98 X10E6/UL (ref 3.77–5.28)
RPR SER QL: NON REACTIVE
WBC # BLD AUTO: 9 X10E3/UL (ref 3.4–10.8)

## 2023-12-28 ENCOUNTER — ROUTINE PRENATAL (OUTPATIENT)
Age: 28
End: 2023-12-28

## 2023-12-28 VITALS
WEIGHT: 175 LBS | RESPIRATION RATE: 16 BRPM | OXYGEN SATURATION: 97 % | DIASTOLIC BLOOD PRESSURE: 69 MMHG | BODY MASS INDEX: 33.04 KG/M2 | SYSTOLIC BLOOD PRESSURE: 107 MMHG | HEIGHT: 61 IN | HEART RATE: 84 BPM

## 2023-12-28 DIAGNOSIS — Z34.83 PRENATAL CARE, SUBSEQUENT PREGNANCY IN THIRD TRIMESTER: Primary | ICD-10-CM

## 2023-12-28 DIAGNOSIS — Z3A.37 37 WEEKS GESTATION OF PREGNANCY: ICD-10-CM

## 2023-12-28 DIAGNOSIS — O24.419 GESTATIONAL DIABETES MELLITUS (GDM) IN THIRD TRIMESTER, GESTATIONAL DIABETES METHOD OF CONTROL UNSPECIFIED: ICD-10-CM

## 2023-12-28 PROCEDURE — 0502F SUBSEQUENT PRENATAL CARE: CPT | Performed by: OBSTETRICS & GYNECOLOGY

## 2023-12-28 NOTE — PROGRESS NOTES
Haily Lucero is a 34 y.o. female 44w1d        OB History    Para Term  AB Living   2       1 0   SAB IAB Ectopic Molar Multiple Live Births   1         0      # Outcome Date GA Lbr Bonilla/2nd Weight Sex Delivery Anes PTL Lv   2 Current            1 SAB                  Mother's Prenatal Vitals  BP: 107/69  Weight - Scale: 79.4 kg (175 lb)  Pulse: 84  Prenatal Fetal Information  Fetal HR: 140s  Movement: Present  Presentation: Vertex  Dil/Eff/Sta  Dilation (cm): 1  Effacement: 0  Station: [place]      The patient was seen and evaluated. There was positive fetal movements. Contractions x 1 day, no lof no bleeding. Signs and symptoms of  labor as well as labor were reviewed. The S/S of Pre-Eclampsia were reviewed with the patient in detail. She is to report any of these if they occur. The patient had her 28 week labs completed. T-Dap Vaccine (27-36 weeks): completed    The patient was instructed on fetal kick counts and was given a kick sheet to complete every 8 hours. She was instructed that the baby should move at a minimum of ten times within one hour after a meal. The patient was instructed to lay down on her left side twenty minutes after eating and count movements for up to one hour with a target value of ten movements. She was instructed to notify the office if she did not make that target after two attempts or if after any attempt there was less than four movements. The patient reports that the targets have been made Yes. Assessment:  1. Haily Lucero is a 34 y.o. female  2.    3. 37w3d    NST in the office - irregular contractions  Sent to LD for further evaluation

## 2023-12-29 NOTE — PROGRESS NOTES
MARK visit  Doing well  Good fetal movement  Fingerstick log reviewed  Precautions  MFM appt scheduled  F/u 1 week

## 2024-01-04 ENCOUNTER — ROUTINE PRENATAL (OUTPATIENT)
Age: 29
End: 2024-01-04

## 2024-01-04 VITALS
HEIGHT: 61 IN | SYSTOLIC BLOOD PRESSURE: 110 MMHG | BODY MASS INDEX: 33.79 KG/M2 | DIASTOLIC BLOOD PRESSURE: 68 MMHG | WEIGHT: 179 LBS

## 2024-01-04 DIAGNOSIS — Z3A.38 38 WEEKS GESTATION OF PREGNANCY: Primary | ICD-10-CM

## 2024-01-04 DIAGNOSIS — O24.410 DIET CONTROLLED GESTATIONAL DIABETES MELLITUS (GDM) IN THIRD TRIMESTER: ICD-10-CM

## 2024-01-04 PROCEDURE — 0502F SUBSEQUENT PRENATAL CARE: CPT | Performed by: OBSTETRICS & GYNECOLOGY

## 2024-01-04 NOTE — PROGRESS NOTES
Questions addressed  MFM US on 12/29- normal BPP and no issues  BS 2 PP< 120, FBS: < 100  IOL PDF in chart for 1/9/24

## 2024-01-08 ENCOUNTER — HOSPITAL ENCOUNTER (INPATIENT)
Facility: HOSPITAL | Age: 29
LOS: 4 days | Discharge: HOME | End: 2024-01-12
Attending: OBSTETRICS & GYNECOLOGY | Admitting: OBSTETRICS & GYNECOLOGY
Payer: COMMERCIAL

## 2024-01-08 ENCOUNTER — ROUTINE PRENATAL (OUTPATIENT)
Age: 29
End: 2024-01-08

## 2024-01-08 VITALS
HEIGHT: 61 IN | DIASTOLIC BLOOD PRESSURE: 80 MMHG | RESPIRATION RATE: 16 BRPM | HEART RATE: 96 BPM | WEIGHT: 180 LBS | BODY MASS INDEX: 33.99 KG/M2 | SYSTOLIC BLOOD PRESSURE: 118 MMHG | OXYGEN SATURATION: 98 %

## 2024-01-08 DIAGNOSIS — Z3A.39 39 WEEKS GESTATION OF PREGNANCY: ICD-10-CM

## 2024-01-08 DIAGNOSIS — O24.419 GESTATIONAL DIABETES MELLITUS (GDM) IN THIRD TRIMESTER, GESTATIONAL DIABETES METHOD OF CONTROL UNSPECIFIED: Primary | ICD-10-CM

## 2024-01-08 LAB
ABO + RH BLD: NORMAL
ALBUMIN SERPL-MCNC: 2.9 G/DL (ref 3.5–5)
ALBUMIN/GLOB SERPL: 0.7 (ref 1.1–2.2)
ALP SERPL-CCNC: 162 U/L (ref 45–117)
ALT SERPL-CCNC: 12 U/L (ref 12–78)
ANION GAP SERPL CALC-SCNC: 8 MMOL/L (ref 5–15)
AST SERPL W P-5'-P-CCNC: 11 U/L (ref 15–37)
BILIRUB SERPL-MCNC: 0.3 MG/DL (ref 0.2–1)
BLOOD GROUP ANTIBODIES SERPL: NEGATIVE
BUN SERPL-MCNC: 5 MG/DL (ref 6–20)
BUN/CREAT SERPL: 8 (ref 12–20)
CA-I BLD-MCNC: 9.4 MG/DL (ref 8.5–10.1)
CHLORIDE SERPL-SCNC: 105 MMOL/L (ref 97–108)
CO2 SERPL-SCNC: 22 MMOL/L (ref 21–32)
CREAT SERPL-MCNC: 0.64 MG/DL (ref 0.55–1.02)
ERYTHROCYTE [DISTWIDTH] IN BLOOD BY AUTOMATED COUNT: 13.1 % (ref 11.5–14.5)
GLOBULIN SER CALC-MCNC: 4.2 G/DL (ref 2–4)
GLUCOSE BLD STRIP.AUTO-MCNC: 109 MG/DL (ref 65–100)
GLUCOSE SERPL-MCNC: 87 MG/DL (ref 65–100)
HCT VFR BLD AUTO: 32.9 % (ref 35–47)
HGB BLD-MCNC: 11 G/DL (ref 11.5–16)
MCH RBC QN AUTO: 27.9 PG (ref 26–34)
MCHC RBC AUTO-ENTMCNC: 33.4 G/DL (ref 30–36.5)
MCV RBC AUTO: 83.5 FL (ref 80–99)
NRBC # BLD: 0 K/UL (ref 0–0.01)
NRBC BLD-RTO: 0 PER 100 WBC
PERFORMED BY:: ABNORMAL
PLATELET # BLD AUTO: 231 K/UL (ref 150–400)
PMV BLD AUTO: 10.4 FL (ref 8.9–12.9)
POTASSIUM SERPL-SCNC: 3.9 MMOL/L (ref 3.5–5.1)
PROT SERPL-MCNC: 7.1 G/DL (ref 6.4–8.2)
RBC # BLD AUTO: 3.94 M/UL (ref 3.8–5.2)
SODIUM SERPL-SCNC: 135 MMOL/L (ref 136–145)
SPECIMEN EXP DATE BLD: NORMAL
WBC # BLD AUTO: 11 K/UL (ref 3.6–11)

## 2024-01-08 PROCEDURE — 86900 BLOOD TYPING SEROLOGIC ABO: CPT

## 2024-01-08 PROCEDURE — 2580000003 HC RX 258: Performed by: OBSTETRICS & GYNECOLOGY

## 2024-01-08 PROCEDURE — 0502F SUBSEQUENT PRENATAL CARE: CPT | Performed by: OBSTETRICS & GYNECOLOGY

## 2024-01-08 PROCEDURE — 1120000000 HC RM PRIVATE OB

## 2024-01-08 PROCEDURE — 6370000000 HC RX 637 (ALT 250 FOR IP): Performed by: OBSTETRICS & GYNECOLOGY

## 2024-01-08 PROCEDURE — 80053 COMPREHEN METABOLIC PANEL: CPT

## 2024-01-08 PROCEDURE — 82962 GLUCOSE BLOOD TEST: CPT

## 2024-01-08 PROCEDURE — 85027 COMPLETE CBC AUTOMATED: CPT

## 2024-01-08 PROCEDURE — 36415 COLL VENOUS BLD VENIPUNCTURE: CPT

## 2024-01-08 PROCEDURE — 86850 RBC ANTIBODY SCREEN: CPT

## 2024-01-08 PROCEDURE — 86901 BLOOD TYPING SEROLOGIC RH(D): CPT

## 2024-01-08 RX ORDER — SODIUM CHLORIDE 9 MG/ML
25 INJECTION, SOLUTION INTRAVENOUS PRN
Status: DISCONTINUED | OUTPATIENT
Start: 2024-01-08 | End: 2024-01-10

## 2024-01-08 RX ORDER — SODIUM CHLORIDE 0.9 % (FLUSH) 0.9 %
5-40 SYRINGE (ML) INJECTION EVERY 12 HOURS SCHEDULED
Status: DISCONTINUED | OUTPATIENT
Start: 2024-01-08 | End: 2024-01-10

## 2024-01-08 RX ORDER — MISOPROSTOL 200 UG/1
800 TABLET ORAL PRN
Status: DISCONTINUED | OUTPATIENT
Start: 2024-01-08 | End: 2024-01-12 | Stop reason: HOSPADM

## 2024-01-08 RX ORDER — SODIUM CHLORIDE, SODIUM LACTATE, POTASSIUM CHLORIDE, AND CALCIUM CHLORIDE .6; .31; .03; .02 G/100ML; G/100ML; G/100ML; G/100ML
1000 INJECTION, SOLUTION INTRAVENOUS PRN
Status: DISCONTINUED | OUTPATIENT
Start: 2024-01-08 | End: 2024-01-10

## 2024-01-08 RX ORDER — ZOLPIDEM TARTRATE 5 MG/1
5 TABLET ORAL NIGHTLY PRN
Status: DISCONTINUED | OUTPATIENT
Start: 2024-01-08 | End: 2024-01-12 | Stop reason: HOSPADM

## 2024-01-08 RX ORDER — SODIUM CHLORIDE, SODIUM LACTATE, POTASSIUM CHLORIDE, AND CALCIUM CHLORIDE .6; .31; .03; .02 G/100ML; G/100ML; G/100ML; G/100ML
500 INJECTION, SOLUTION INTRAVENOUS PRN
Status: DISCONTINUED | OUTPATIENT
Start: 2024-01-08 | End: 2024-01-12 | Stop reason: HOSPADM

## 2024-01-08 RX ORDER — FENTANYL CITRATE 50 UG/ML
50 INJECTION, SOLUTION INTRAMUSCULAR; INTRAVENOUS
Status: DISCONTINUED | OUTPATIENT
Start: 2024-01-08 | End: 2024-01-10

## 2024-01-08 RX ORDER — ONDANSETRON 2 MG/ML
4 INJECTION INTRAMUSCULAR; INTRAVENOUS EVERY 6 HOURS PRN
Status: DISCONTINUED | OUTPATIENT
Start: 2024-01-08 | End: 2024-01-12 | Stop reason: HOSPADM

## 2024-01-08 RX ORDER — SODIUM CHLORIDE 0.9 % (FLUSH) 0.9 %
5-40 SYRINGE (ML) INJECTION PRN
Status: DISCONTINUED | OUTPATIENT
Start: 2024-01-08 | End: 2024-01-10

## 2024-01-08 RX ORDER — METHYLERGONOVINE MALEATE 0.2 MG/ML
200 INJECTION INTRAVENOUS PRN
Status: DISCONTINUED | OUTPATIENT
Start: 2024-01-08 | End: 2024-01-12 | Stop reason: HOSPADM

## 2024-01-08 RX ORDER — SODIUM CHLORIDE, SODIUM LACTATE, POTASSIUM CHLORIDE, CALCIUM CHLORIDE 600; 310; 30; 20 MG/100ML; MG/100ML; MG/100ML; MG/100ML
INJECTION, SOLUTION INTRAVENOUS CONTINUOUS
Status: DISCONTINUED | OUTPATIENT
Start: 2024-01-09 | End: 2024-01-12 | Stop reason: HOSPADM

## 2024-01-08 RX ORDER — CARBOPROST TROMETHAMINE 250 UG/ML
250 INJECTION, SOLUTION INTRAMUSCULAR PRN
Status: DISCONTINUED | OUTPATIENT
Start: 2024-01-08 | End: 2024-01-12 | Stop reason: HOSPADM

## 2024-01-08 RX ADMIN — Medication 25 MCG: at 20:04

## 2024-01-08 RX ADMIN — SODIUM CHLORIDE, PRESERVATIVE FREE 10 ML: 5 INJECTION INTRAVENOUS at 20:59

## 2024-01-08 ASSESSMENT — PAIN SCALES - GENERAL: PAINLEVEL_OUTOF10: 0

## 2024-01-08 NOTE — H&P
History and Physical    Patient: Nishi Ricardo MRN: [id number]  SSN: [id number]    YOB: 1995  Age: 28 y.o.  Sex: female      Subjective:      Nishi Ricardo is a 28 y.o. female at 39 weeks presents for IOL with cytotec due to GDM- controlled with diet. Cytotec consent signed at appointment last week- risks benefits and alternatives DWP including possible     Past Medical History:   Diagnosis Date    Anxiety      Past Surgical History:   Procedure Laterality Date    EYE SURGERY Bilateral 2019      Family History   Problem Relation Age of Onset    No Known Problems Father     No Known Problems Mother      Social History     Tobacco Use    Smoking status: Never    Smokeless tobacco: Never   Substance Use Topics    Alcohol use: Never      Prior to Admission medications    Medication Sig Start Date End Date Taking? Authorizing Provider   vitamin D3 (CHOLECALCIFEROL) 25 MCG (1000 UT) TABS tablet Take 1 tablet by mouth daily 23   Aide Levy MD   omeprazole (PRILOSEC) 20 MG delayed release capsule Take 1 capsule by mouth every morning (before breakfast) 23   Aide Levy MD   vitamin D 25 MCG (1000 UT) CAPS Take by mouth    Provider, MD Jillian   Prenatal Vit-Fe Fumarate-FA (PRENATAL VITAMIN) 27-0.8 MG TABS Take 1 tablet by mouth daily 8/15/23   Aide Levy MD        No Known Allergies    Review of Systems:  A comprehensive review of systems was negative except for that written in the History of Present Illness.      Objective:     There were no vitals filed for this visit.     Physical Exam:  NAD  Abd: Gravid, NST reactive  Cx: thick and 1 cm per office visit today    Labs pending    Assessment:     Present on Admission:   39 weeks gestation of pregnancy   Diet controlled gestational diabetes mellitus (GDM) in third trimester      Plan:     Cytotec IOL      Signed By: Aden Blancas MD     2024

## 2024-01-08 NOTE — PROGRESS NOTES
1547: Pt arrived to unit for scheduled induction. Pt placed in LD9. Routine induction orders completed.

## 2024-01-09 LAB
GLUCOSE BLD STRIP.AUTO-MCNC: 164 MG/DL (ref 65–100)
GLUCOSE BLD STRIP.AUTO-MCNC: 91 MG/DL (ref 65–100)
PERFORMED BY:: ABNORMAL
PERFORMED BY:: NORMAL

## 2024-01-09 PROCEDURE — 2580000003 HC RX 258: Performed by: OBSTETRICS & GYNECOLOGY

## 2024-01-09 PROCEDURE — 1120000000 HC RM PRIVATE OB

## 2024-01-09 PROCEDURE — 6370000000 HC RX 637 (ALT 250 FOR IP)

## 2024-01-09 PROCEDURE — 6370000000 HC RX 637 (ALT 250 FOR IP): Performed by: OBSTETRICS & GYNECOLOGY

## 2024-01-09 PROCEDURE — 82962 GLUCOSE BLOOD TEST: CPT

## 2024-01-09 PROCEDURE — 6360000002 HC RX W HCPCS

## 2024-01-09 RX ORDER — ACETAMINOPHEN 325 MG/1
650 TABLET ORAL EVERY 4 HOURS PRN
Status: DISCONTINUED | OUTPATIENT
Start: 2024-01-09 | End: 2024-01-10

## 2024-01-09 RX ORDER — TERBUTALINE SULFATE 1 MG/ML
0.25 INJECTION, SOLUTION SUBCUTANEOUS
Status: COMPLETED | OUTPATIENT
Start: 2024-01-09 | End: 2024-01-09

## 2024-01-09 RX ORDER — TERBUTALINE SULFATE 1 MG/ML
INJECTION, SOLUTION SUBCUTANEOUS
Status: COMPLETED
Start: 2024-01-09 | End: 2024-01-09

## 2024-01-09 RX ADMIN — Medication 25 MCG: at 09:28

## 2024-01-09 RX ADMIN — TERBUTALINE SULFATE 0.25 MG: 1 INJECTION, SOLUTION SUBCUTANEOUS at 01:54

## 2024-01-09 RX ADMIN — Medication 50 MCG: at 00:05

## 2024-01-09 RX ADMIN — Medication 25 MCG: at 18:10

## 2024-01-09 RX ADMIN — ACETAMINOPHEN 650 MG: 325 TABLET ORAL at 04:55

## 2024-01-09 RX ADMIN — SODIUM CHLORIDE, POTASSIUM CHLORIDE, SODIUM LACTATE AND CALCIUM CHLORIDE: 600; 310; 30; 20 INJECTION, SOLUTION INTRAVENOUS at 14:52

## 2024-01-09 RX ADMIN — SODIUM CHLORIDE, POTASSIUM CHLORIDE, SODIUM LACTATE AND CALCIUM CHLORIDE: 600; 310; 30; 20 INJECTION, SOLUTION INTRAVENOUS at 06:14

## 2024-01-09 RX ADMIN — SODIUM CHLORIDE, POTASSIUM CHLORIDE, SODIUM LACTATE AND CALCIUM CHLORIDE: 600; 310; 30; 20 INJECTION, SOLUTION INTRAVENOUS at 00:03

## 2024-01-09 RX ADMIN — Medication 25 MCG: at 13:51

## 2024-01-09 ASSESSMENT — PAIN DESCRIPTION - LOCATION
LOCATION: ABDOMEN
LOCATION: HEAD

## 2024-01-09 ASSESSMENT — PAIN SCALES - GENERAL
PAINLEVEL_OUTOF10: 0
PAINLEVEL_OUTOF10: 5
PAINLEVEL_OUTOF10: 0
PAINLEVEL_OUTOF10: 6
PAINLEVEL_OUTOF10: 0
PAINLEVEL_OUTOF10: 2
PAINLEVEL_OUTOF10: 0

## 2024-01-09 ASSESSMENT — PAIN DESCRIPTION - DESCRIPTORS
DESCRIPTORS: CRAMPING
DESCRIPTORS: CRAMPING
DESCRIPTORS: ACHING

## 2024-01-09 ASSESSMENT — PAIN DESCRIPTION - FREQUENCY: FREQUENCY: INTERMITTENT

## 2024-01-09 NOTE — PROGRESS NOTES
Labor Progress Note  Patient seen, fetal heart rate and contraction pattern evaluated, patient examined.  BP (!) 103/56   Pulse 84   Temp 98.3 °F (36.8 °C) (Oral)   Resp 16   SpO2 98%     Physical Exam:  Cervical Exam:  2 cm dilated  (tight)  Membranes:  Intact  Uterine Activity: irregular  Fetal Heart Rate: Reactive    Assessment/Plan:  Reassuring fetal status, Labor  Progressing normally  Continue expectant management, Continue plan for vaginal delivery. Continue with cervical ripening

## 2024-01-09 NOTE — PROGRESS NOTES
1925  Plan of care explained to pt.  Wuestions answered.  Pt verbalized understanding.    2030  Additional sugar free snacks given to pt.  Pt reminded nothing to eat after midnight; clear liquids only.  Pt verbalized understanding.    2100  Pt declined offer for sleep medication (ambien) at this time.

## 2024-01-09 NOTE — PROGRESS NOTES
0735: Received care of Ms. Ricardo resting in bed and awake. No acute distress noted. Bedside report received from Jazmin Spencer RN.    0740: Shift assessment initiated and completed. Temp 98.3 and denied pain at the this time. POC reviewed.     0913: Dr. Shankar in the patient's room and discussing the POC. Consent signed and witnessed. Verbal order received from Dr. Shankar to discontinue the glucose checks.    0928: Cytotec 25 mcg sublingual as ordered per Dr. Shankar.     1100: Temp 98.3 and denied pain. Accepting clear liquid diet.     1312: Dr. Shankar in the patient's room.     1313: Ve performed by Dr. Shankar. Cervix: Tight 2cm/50%/-3. Verbal order received from Dr. Shankar to administer Cytotec 25mcg buccal/sublingual    1351: Patient sitting up in the bed and eating. Cytotec 25mcg placed under her tongue as ordered per Dr. Shankar.     1452: Temp 98.3 and denied pain. 1000ml L/R hung and infusing at 125ml/hr via the pump. No infiltration noted.     1604: Ve performed. Cervix: 1-2cm/50%/-3. Small amount of dark brownish bloody discharge noted on the exam glove. The patient requesting to see Dr. Shankar. She stating that she wants to go home. The writer informed the patient that Dr. Shankar would be notified.     1607: Dr. Shankar present in OB and informed of the cervical dilatation. Tahe writer informed Dr. Shankar that the patient wanting to go home. No new orders received.     1631: The writer showed Dr. Shankar the patient's OB strip thus far, including the late decels registering. No new orders received.      1659: Dr. Shankar in the patient's room and discussing the POC.     1708: The writer received verbal orders from Dr. Shankar as followed: Ok to have a  regular diet and may shower.    1810: Cytotec 25 mcg placed sublingual/buccal. Patient accepting regular diet.     1915: Bedside report given to Jazmin Spencer RN.

## 2024-01-10 ENCOUNTER — ANESTHESIA (OUTPATIENT)
Facility: HOSPITAL | Age: 29
End: 2024-01-10
Payer: COMMERCIAL

## 2024-01-10 ENCOUNTER — ANESTHESIA EVENT (OUTPATIENT)
Facility: HOSPITAL | Age: 29
End: 2024-01-10
Payer: COMMERCIAL

## 2024-01-10 PROBLEM — Z3A.39 39 WEEKS GESTATION OF PREGNANCY: Status: RESOLVED | Noted: 2024-01-08 | Resolved: 2024-01-10

## 2024-01-10 PROBLEM — O42.90 LEAKAGE OF AMNIOTIC FLUID: Status: RESOLVED | Noted: 2024-01-10 | Resolved: 2024-01-10

## 2024-01-10 PROBLEM — O42.90 LEAKAGE OF AMNIOTIC FLUID: Status: ACTIVE | Noted: 2024-01-10

## 2024-01-10 PROBLEM — O24.410 DIET CONTROLLED GESTATIONAL DIABETES MELLITUS (GDM) IN THIRD TRIMESTER: Status: RESOLVED | Noted: 2024-01-04 | Resolved: 2024-01-10

## 2024-01-10 PROCEDURE — 7220000101 HC DELIVERY VAGINAL/SINGLE

## 2024-01-10 PROCEDURE — 2500000003 HC RX 250 WO HCPCS: Performed by: OBSTETRICS & GYNECOLOGY

## 2024-01-10 PROCEDURE — 3700000156 HC EPIDURAL ANESTHESIA

## 2024-01-10 PROCEDURE — 6360000002 HC RX W HCPCS: Performed by: OBSTETRICS & GYNECOLOGY

## 2024-01-10 PROCEDURE — 7100000000 HC PACU RECOVERY - FIRST 15 MIN

## 2024-01-10 PROCEDURE — 7100000001 HC PACU RECOVERY - ADDTL 15 MIN

## 2024-01-10 PROCEDURE — 00HU33Z INSERTION OF INFUSION DEVICE INTO SPINAL CANAL, PERCUTANEOUS APPROACH: ICD-10-PCS | Performed by: ANESTHESIOLOGY

## 2024-01-10 PROCEDURE — 6360000002 HC RX W HCPCS: Performed by: NURSE ANESTHETIST, CERTIFIED REGISTERED

## 2024-01-10 PROCEDURE — 7210000100 HC LABOR FEE PER 1 HR

## 2024-01-10 PROCEDURE — 87086 URINE CULTURE/COLONY COUNT: CPT

## 2024-01-10 PROCEDURE — 6370000000 HC RX 637 (ALT 250 FOR IP): Performed by: OBSTETRICS & GYNECOLOGY

## 2024-01-10 PROCEDURE — 1120000000 HC RM PRIVATE OB

## 2024-01-10 PROCEDURE — 2500000003 HC RX 250 WO HCPCS: Performed by: NURSE ANESTHETIST, CERTIFIED REGISTERED

## 2024-01-10 PROCEDURE — 4A1HX4Z MONITORING OF PRODUCTS OF CONCEPTION, CARDIAC ELECTRICAL ACTIVITY, EXTERNAL APPROACH: ICD-10-PCS | Performed by: OBSTETRICS & GYNECOLOGY

## 2024-01-10 PROCEDURE — 2580000003 HC RX 258: Performed by: OBSTETRICS & GYNECOLOGY

## 2024-01-10 PROCEDURE — 3700000025 EPIDURAL BLOCK: Performed by: ANESTHESIOLOGY

## 2024-01-10 PROCEDURE — 0KQM0ZZ REPAIR PERINEUM MUSCLE, OPEN APPROACH: ICD-10-PCS | Performed by: OBSTETRICS & GYNECOLOGY

## 2024-01-10 PROCEDURE — 59400 OBSTETRICAL CARE: CPT | Performed by: OBSTETRICS & GYNECOLOGY

## 2024-01-10 RX ORDER — SODIUM CHLORIDE 0.9 % (FLUSH) 0.9 %
5-40 SYRINGE (ML) INJECTION EVERY 12 HOURS SCHEDULED
Status: DISCONTINUED | OUTPATIENT
Start: 2024-01-10 | End: 2024-01-12 | Stop reason: HOSPADM

## 2024-01-10 RX ORDER — OXYCODONE HYDROCHLORIDE 5 MG/1
5 TABLET ORAL EVERY 4 HOURS PRN
Status: DISCONTINUED | OUTPATIENT
Start: 2024-01-10 | End: 2024-01-12 | Stop reason: HOSPADM

## 2024-01-10 RX ORDER — NALOXONE HYDROCHLORIDE 0.4 MG/ML
INJECTION, SOLUTION INTRAMUSCULAR; INTRAVENOUS; SUBCUTANEOUS PRN
Status: DISCONTINUED | OUTPATIENT
Start: 2024-01-10 | End: 2024-01-10

## 2024-01-10 RX ORDER — ACETAMINOPHEN 500 MG
1000 TABLET ORAL EVERY 8 HOURS SCHEDULED
Status: DISCONTINUED | OUTPATIENT
Start: 2024-01-11 | End: 2024-01-12 | Stop reason: HOSPADM

## 2024-01-10 RX ORDER — BUPIVACAINE HYDROCHLORIDE 2.5 MG/ML
INJECTION, SOLUTION EPIDURAL; INFILTRATION; INTRACAUDAL PRN
Status: DISCONTINUED | OUTPATIENT
Start: 2024-01-10 | End: 2024-01-10 | Stop reason: SDUPTHER

## 2024-01-10 RX ORDER — LIDOCAINE HYDROCHLORIDE 10 MG/ML
INJECTION, SOLUTION INFILTRATION; PERINEURAL PRN
Status: DISCONTINUED | OUTPATIENT
Start: 2024-01-10 | End: 2024-01-10 | Stop reason: SDUPTHER

## 2024-01-10 RX ORDER — ONDANSETRON 2 MG/ML
4 INJECTION INTRAMUSCULAR; INTRAVENOUS EVERY 6 HOURS PRN
Status: DISCONTINUED | OUTPATIENT
Start: 2024-01-10 | End: 2024-01-10

## 2024-01-10 RX ORDER — BUPIVACAINE HYDROCHLORIDE 2.5 MG/ML
INJECTION, SOLUTION EPIDURAL; INFILTRATION; INTRACAUDAL PRN
Status: DISCONTINUED | OUTPATIENT
Start: 2024-01-10 | End: 2024-01-10

## 2024-01-10 RX ORDER — MODIFIED LANOLIN
OINTMENT (GRAM) TOPICAL PRN
Status: DISCONTINUED | OUTPATIENT
Start: 2024-01-10 | End: 2024-01-12 | Stop reason: HOSPADM

## 2024-01-10 RX ORDER — OXYCODONE HYDROCHLORIDE 10 MG/1
10 TABLET ORAL EVERY 4 HOURS PRN
Status: DISCONTINUED | OUTPATIENT
Start: 2024-01-10 | End: 2024-01-12 | Stop reason: HOSPADM

## 2024-01-10 RX ORDER — FENTANYL 0.2 MG/100ML-BUPIV 0.125%-NACL 0.9% EPIDURAL INJ 2/0.125 MCG/ML-%
10 SOLUTION INJECTION CONTINUOUS
Status: DISCONTINUED | OUTPATIENT
Start: 2024-01-10 | End: 2024-01-10

## 2024-01-10 RX ORDER — IBUPROFEN 800 MG/1
800 TABLET ORAL EVERY 8 HOURS SCHEDULED
Status: DISCONTINUED | OUTPATIENT
Start: 2024-01-10 | End: 2024-01-12 | Stop reason: HOSPADM

## 2024-01-10 RX ORDER — SODIUM CHLORIDE 9 MG/ML
INJECTION, SOLUTION INTRAVENOUS PRN
Status: DISCONTINUED | OUTPATIENT
Start: 2024-01-10 | End: 2024-01-12 | Stop reason: HOSPADM

## 2024-01-10 RX ORDER — NALBUPHINE HYDROCHLORIDE 10 MG/ML
5 INJECTION, SOLUTION INTRAMUSCULAR; INTRAVENOUS; SUBCUTANEOUS EVERY 4 HOURS PRN
Status: DISCONTINUED | OUTPATIENT
Start: 2024-01-10 | End: 2024-01-10

## 2024-01-10 RX ORDER — OXYCODONE HYDROCHLORIDE 5 MG/1
5 TABLET ORAL EVERY 4 HOURS PRN
Qty: 9 TABLET | Refills: 0 | Status: SHIPPED | OUTPATIENT
Start: 2024-01-10 | End: 2024-01-12

## 2024-01-10 RX ORDER — LIDOCAINE HYDROCHLORIDE 10 MG/ML
2 INJECTION, SOLUTION EPIDURAL; INFILTRATION; INTRACAUDAL; PERINEURAL
Status: COMPLETED | OUTPATIENT
Start: 2024-01-10 | End: 2024-01-10

## 2024-01-10 RX ORDER — LIDOCAINE HYDROCHLORIDE 20 MG/ML
INJECTION, SOLUTION EPIDURAL; INFILTRATION; INTRACAUDAL; PERINEURAL PRN
Status: DISCONTINUED | OUTPATIENT
Start: 2024-01-10 | End: 2024-01-10 | Stop reason: SDUPTHER

## 2024-01-10 RX ORDER — DOCUSATE SODIUM 100 MG/1
100 CAPSULE, LIQUID FILLED ORAL 2 TIMES DAILY
Status: DISCONTINUED | OUTPATIENT
Start: 2024-01-10 | End: 2024-01-12 | Stop reason: HOSPADM

## 2024-01-10 RX ORDER — IBUPROFEN 800 MG/1
800 TABLET ORAL EVERY 8 HOURS PRN
Qty: 30 TABLET | Refills: 0 | Status: SHIPPED | OUTPATIENT
Start: 2024-01-10 | End: 2024-01-20

## 2024-01-10 RX ORDER — LIDOCAINE HYDROCHLORIDE 10 MG/ML
INJECTION, SOLUTION EPIDURAL; INFILTRATION; INTRACAUDAL; PERINEURAL
Status: DISPENSED
Start: 2024-01-10 | End: 2024-01-11

## 2024-01-10 RX ORDER — BUPIVACAINE HYDROCHLORIDE 2.5 MG/ML
INJECTION, SOLUTION EPIDURAL; INFILTRATION; INTRACAUDAL
Status: COMPLETED
Start: 2024-01-10 | End: 2024-01-10

## 2024-01-10 RX ORDER — SODIUM CHLORIDE 0.9 % (FLUSH) 0.9 %
5-40 SYRINGE (ML) INJECTION PRN
Status: DISCONTINUED | OUTPATIENT
Start: 2024-01-10 | End: 2024-01-12 | Stop reason: HOSPADM

## 2024-01-10 RX ADMIN — DOCUSATE SODIUM 100 MG: 100 CAPSULE, LIQUID FILLED ORAL at 21:57

## 2024-01-10 RX ADMIN — LIDOCAINE HYDROCHLORIDE 3 ML: 10 INJECTION, SOLUTION INFILTRATION; PERINEURAL at 08:58

## 2024-01-10 RX ADMIN — SODIUM CHLORIDE, POTASSIUM CHLORIDE, SODIUM LACTATE AND CALCIUM CHLORIDE 1000 ML: 600; 310; 30; 20 INJECTION, SOLUTION INTRAVENOUS at 08:15

## 2024-01-10 RX ADMIN — BUPIVACAINE HYDROCHLORIDE 3 ML: 2.5 INJECTION, SOLUTION EPIDURAL; INFILTRATION; INTRACAUDAL; PERINEURAL at 09:10

## 2024-01-10 RX ADMIN — BUPIVACAINE HYDROCHLORIDE 3 ML: 2.5 INJECTION, SOLUTION EPIDURAL; INFILTRATION; INTRACAUDAL; PERINEURAL at 09:15

## 2024-01-10 RX ADMIN — LIDOCAINE HYDROCHLORIDE 3 ML: 20 INJECTION, SOLUTION EPIDURAL; INFILTRATION; INTRACAUDAL; PERINEURAL at 17:18

## 2024-01-10 RX ADMIN — LIDOCAINE HYDROCHLORIDE 2 ML: 10 INJECTION, SOLUTION EPIDURAL; INFILTRATION; INTRACAUDAL; PERINEURAL at 18:17

## 2024-01-10 RX ADMIN — IBUPROFEN 800 MG: 800 TABLET ORAL at 21:58

## 2024-01-10 RX ADMIN — Medication 500 ML: at 18:08

## 2024-01-10 RX ADMIN — SODIUM CHLORIDE, POTASSIUM CHLORIDE, SODIUM LACTATE AND CALCIUM CHLORIDE: 600; 310; 30; 20 INJECTION, SOLUTION INTRAVENOUS at 00:26

## 2024-01-10 RX ADMIN — Medication 10 ML/HR: at 09:46

## 2024-01-10 RX ADMIN — FENTANYL CITRATE 50 MCG: 50 INJECTION, SOLUTION INTRAMUSCULAR; INTRAVENOUS at 05:23

## 2024-01-10 RX ADMIN — Medication 25 MCG: at 00:34

## 2024-01-10 RX ADMIN — SODIUM CHLORIDE, POTASSIUM CHLORIDE, SODIUM LACTATE AND CALCIUM CHLORIDE: 600; 310; 30; 20 INJECTION, SOLUTION INTRAVENOUS at 09:00

## 2024-01-10 RX ADMIN — Medication 2 MILLI-UNITS/MIN: at 09:50

## 2024-01-10 RX ADMIN — FENTANYL CITRATE 50 MCG: 50 INJECTION, SOLUTION INTRAMUSCULAR; INTRAVENOUS at 06:53

## 2024-01-10 ASSESSMENT — PAIN DESCRIPTION - DESCRIPTORS
DESCRIPTORS: PRESSURE
DESCRIPTORS: ACHING;SHARP
DESCRIPTORS: PRESSURE
DESCRIPTORS: ACHING;SHARP
DESCRIPTORS: ACHING;SHARP
DESCRIPTORS: SHARP
DESCRIPTORS: PRESSURE
DESCRIPTORS: SHARP
DESCRIPTORS: PRESSURE
DESCRIPTORS: ACHING;SHARP
DESCRIPTORS: SHARP
DESCRIPTORS: ACHING;SHARP

## 2024-01-10 ASSESSMENT — PAIN DESCRIPTION - LOCATION
LOCATION: VAGINA
LOCATION: ABDOMEN
LOCATION: BACK
LOCATION: VAGINA
LOCATION: VAGINA
LOCATION: ABDOMEN
LOCATION: VAGINA

## 2024-01-10 ASSESSMENT — PAIN SCALES - GENERAL
PAINLEVEL_OUTOF10: 0
PAINLEVEL_OUTOF10: 6
PAINLEVEL_OUTOF10: 10
PAINLEVEL_OUTOF10: 6
PAINLEVEL_OUTOF10: 0
PAINLEVEL_OUTOF10: 6
PAINLEVEL_OUTOF10: 6
PAINLEVEL_OUTOF10: 0
PAINLEVEL_OUTOF10: 6
PAINLEVEL_OUTOF10: 0

## 2024-01-10 ASSESSMENT — PAIN DESCRIPTION - ORIENTATION: ORIENTATION: LOWER

## 2024-01-10 ASSESSMENT — PAIN DESCRIPTION - FREQUENCY
FREQUENCY: INTERMITTENT

## 2024-01-10 ASSESSMENT — PAIN DESCRIPTION - RADICULAR PAIN: RADICULAR_PAIN: ABSENT

## 2024-01-10 NOTE — PROGRESS NOTES
OB PROGRESS NOTE    PROBLEM:  Patient Active Problem List   Diagnosis    Diet controlled gestational diabetes mellitus (GDM) in third trimester    39-2/7 weeks gestation of pregnancy    Leakage of amniotic fluid        SUBJECTIVE: Patient complains of labor pains.    VITALS:  Vitals:    01/10/24 0730   BP:    Pulse:    Resp:    Temp:    SpO2: 100%      HEART: Regular rhythm, no murmur  LUNGS: Clear to auscultation at both fields  ABDOMEN: Gravid, fetal heart tones present  PELVIC: Cervical dilation: 2-3 cm, effacement: 80%, presentation: Vertex, Station: -3, membranes: SROM @ 0436 today (January 10, 2024).      LABS:  Results for orders placed or performed during the hospital encounter of 01/08/24   Comprehensive metabolic panel   Result Value Ref Range    Sodium 135 (L) 136 - 145 mmol/L    Potassium 3.9 3.5 - 5.1 mmol/L    Chloride 105 97 - 108 mmol/L    CO2 22 21 - 32 mmol/L    Anion Gap 8 5 - 15 mmol/L    Glucose 87 65 - 100 mg/dL    BUN 5 (L) 6 - 20 mg/dL    Creatinine 0.64 0.55 - 1.02 mg/dL    Bun/Cre Ratio 8 (L) 12 - 20      Est, Glom Filt Rate >60 >60 ml/min/1.73m2    Calcium 9.4 8.5 - 10.1 mg/dL    Total Bilirubin 0.3 0.2 - 1.0 mg/dL    AST 11 (L) 15 - 37 U/L    ALT 12 12 - 78 U/L    Alk Phosphatase 162 (H) 45 - 117 U/L    Total Protein 7.1 6.4 - 8.2 g/dL    Albumin 2.9 (L) 3.5 - 5.0 g/dL    Globulin 4.2 (H) 2.0 - 4.0 g/dL    Albumin/Globulin Ratio 0.7 (L) 1.1 - 2.2     CBC   Result Value Ref Range    WBC 11.0 3.6 - 11.0 K/uL    RBC 3.94 3.80 - 5.20 M/uL    Hemoglobin 11.0 (L) 11.5 - 16.0 g/dL    Hematocrit 32.9 (L) 35.0 - 47.0 %    MCV 83.5 80.0 - 99.0 FL    MCH 27.9 26.0 - 34.0 PG    MCHC 33.4 30.0 - 36.5 g/dL    RDW 13.1 11.5 - 14.5 %    Platelets 231 150 - 400 K/uL    MPV 10.4 8.9 - 12.9 FL    Nucleated RBCs 0.0 0.0  WBC    nRBC 0.00 0.00 - 0.01 K/uL   POCT Glucose   Result Value Ref Range    POC Glucose 109 (H) 65 - 100 mg/dL    Performed by: NARA BARBER    POCT Glucose   Result Value Ref Range     POC Glucose 91 65 - 100 mg/dL    Performed by: NARA BARBER    POCT Glucose   Result Value Ref Range    POC Glucose 164 (H) 65 - 100 mg/dL    Performed by: NARA BARBER    TYPE AND SCREEN   Result Value Ref Range    Crossmatch expiration date 2024,5954     ABO/Rh O Positive     Antibody Screen Negative       Fetal monitoring: Category 1    ASSESSMENT: 28 years old -0-1-0 with IUP at 39-2/7 weeks admitted 2 days ago for IOL/cervical ripening secondary to gestational diabetes, diet-controlled.  After several doses of Cytotec, she has spontaneous rupture membranes earlier today and cervix is favorable for induction at this time.    PLAN:  The risks, benefits, complications, alternative treatment options, and expected outcomes were discussed with the patient. Patient was given an opportunity to ask questions, all questions were answered, patient voiced understanding of above.     Notify anesthesia for epidural analgesia  Start Pitocin augmentation

## 2024-01-10 NOTE — PROGRESS NOTES
2020  Pt didn't eat much from supper tray.  Pt's family brought food from home for pt.  Writer will check pt accu-chek 2 hours after eating.  Pt and family reminded of visitation regarding children.  Family brought niece and nephew under the age of 5.  Writer allowed children a few minutes to say \"Hi\" prior to going to visitor krishna.

## 2024-01-10 NOTE — L&D DELIVERY SUMMARY NOTE
DELIVERY NOTE    PREOPERATIVE DIAGNOSIS:   Intrauterine pregnancy at 39-2/7 weeks gestation  Diet-controlled gestational diabetes mellitus  Spontaneous rupture membranes    POSTOPERATIVE DIAGNOSIS:   Intrauterine pregnancy at 39-2/7 weeks gestation delivered  Diet-controlled gestational diabetes mellitus  Spontaneous rupture membranes  Second-degree perineal laceration    PROCEDURE:  Spontaneous vaginal delivery  Repair of second-degree perineal laceration  Electronic fetal monitoring    ANESTHESIA: Epidural    ANESTHESIOLOGIST: Jan Menjivar APRN - CRNA     SURGEON: Hernando Gurrola M.D.    ASSISTANT:N/A    COMPLICATIONS: None    CONDITION DURING PROCEDURE: Stable    ESTIMATED BLOOD LOSS: 175 mL    SURGICAL COUNT: Correct x 3.    SPECIMEN: None    FINDINGS: Male infant, cephalic presentation, CODEY.  Apgar's: 8/9/9. Intact placenta.  Three-vessel cord.  Second-degree perineal laceration.    DESCRIPTION OF PROCEDURE: The patient was admitted 2 days ago for IOL due to gestational diabetes.  After Cytotec, this morning she was already 3 cm dilated. Pitocin was started and she actually went to complete dilation.  The patient pushed for little bit longer and under sterile and controlled conditions had a spontaneous vaginal delivery, CODEY, of a male infant, cephalic, at 1804.  Cord was clamped x2 and cut, and the baby was handed off to waiting nurse.  Umbilical cord blood sample taken.  Spontaneous and complete delivery of intact placenta and three-vessel cord noted at 1808.  A second-degree perineal laceration identified and was approximated with 2-0 Vicryl and 3-0 chromic sutures.  Laceration was priorly infiltrated with 5 mL of lidocaine 1%.  No cervical or vaginal laceration noted.  Uterus noted well contracted and not actively bleeding.  Mother and baby are both doing well.  Mother will go to postpartum.

## 2024-01-10 NOTE — ANESTHESIA PROCEDURE NOTES
Epidural Block    Patient location during procedure: OB  Start time: 1/10/2024 8:54 AM  End time: 1/10/2024 9:20 AM  Reason for block: labor epidural  Staffing  Performed: resident/CRNA   Resident/CRNA: Jan Menjivar APRN - CRNA  Performed by: Jan Menjivar APRN - CRNA  Authorized by: Louie Aaron MD    Epidural  Patient position: sitting  Prep: ChloraPrep  Patient monitoring: continuous pulse ox and frequent blood pressure checks  Approach: midline  Location: L3-4  Injection technique: JOANA saline  Provider prep: mask and sterile gloves  Needle  Needle type: Tuohy   Needle gauge: 17 G  Needle length: 3.5 in  Needle insertion depth: 6 cm  Catheter type: end hole  Catheter size: 19 G  Catheter at skin depth: 12 cm  Test dose: negativeCatheter Secured: tegaderm and tape  Assessment  Hemodynamics: stable  Attempts: 1  Outcomes: uncomplicated  Preanesthetic Checklist  Completed: patient identified, IV checked, site marked, risks and benefits discussed, surgical/procedural consents, equipment checked, pre-op evaluation, timeout performed, anesthesia consent given, oxygen available, monitors applied/VS acknowledged, fire risk safety assessment completed and verbalized and blood product R/B/A discussed and consented

## 2024-01-10 NOTE — ANESTHESIA PRE PROCEDURE
IntraVENous PRN Aide Levy MD       • methylergonovine (METHERGINE) injection 200 mcg  200 mcg IntraMUSCular PRN Aide Levy MD       • carboprost (HEMABATE) injection 250 mcg  250 mcg IntraMUSCular PRN Aide Levy MD       • miSOPROStol (CYTOTEC) tablet 800 mcg  800 mcg Rectal PRN Aide Levy MD       • oxytocin (PITOCIN) 30 units in 500 mL infusion  87.3 minda-units/min IntraVENous Continuous PRN Aide Levy MD        And   • oxytocin (PITOCIN) 10 unit bolus from the bag  10 Units IntraVENous PRN Aide Levy MD       • ondansetron (ZOFRAN) injection 4 mg  4 mg IntraVENous Q6H PRN Aide Levy MD       • zolpidem (AMBIEN) tablet 5 mg  5 mg Oral Nightly PRN Aden Blancas MD       • fentaNYL (SUBLIMAZE) injection 50 mcg  50 mcg IntraVENous Q1H PRN Aden Blancas MD   50 mcg at 01/10/24 0653       Allergies:  No Known Allergies    Problem List:    Patient Active Problem List   Diagnosis Code   • Diet controlled gestational diabetes mellitus (GDM) in third trimester O24.410   • 39-2/7 weeks gestation of pregnancy Z3A.39   • Leakage of amniotic fluid O42.90       Past Medical History:        Diagnosis Date   • Anxiety        Past Surgical History:        Procedure Laterality Date   • EYE SURGERY Bilateral 2019       Social History:    Social History     Tobacco Use   • Smoking status: Never   • Smokeless tobacco: Never   Substance Use Topics   • Alcohol use: Never                                Counseling given: Not Answered      Vital Signs (Current):   Vitals:    01/10/24 0722 01/10/24 0727 01/10/24 0729 01/10/24 0730   BP:   114/74    Pulse: 80 86 93    Resp:   16    Temp:   36.7 °C (98.1 °F)    TempSrc:   Oral    SpO2: 97% 98% 99% 100%                                              BP Readings from Last 3 Encounters:   01/10/24 114/74   01/08/24 118/80   01/04/24 110/68       NPO Status:

## 2024-01-10 NOTE — PROGRESS NOTES
Labor Progress Note= late entry  Patient seen, fetal heart rate and contraction pattern evaluated, patient examined.  /77   Pulse 86   Temp 98.1 °F (36.7 °C) (Oral)   Resp 24   SpO2 96%     Physical Exam:  Cervical Exam:  2 cm dilated    70% effaced    -3 station    Membranes:  Intact  Uterine Activity: irregular  Fetal Heart Rate: Reactive    Assessment/Plan:  Reassuring fetal status, Continue plan for vaginal delivery, continue with cervical ripening. Discussed with patient cervical exam.  Need for continual cervical ripening. All questions answered . Agrees with POC with continuation with cytotec

## 2024-01-10 NOTE — PROGRESS NOTES
Labor Progress Note- Late entry  Care Assumed From Dr Shankar, fetal heart rate and contraction pattern evaluated.     Physical Exam:  Cervical Exam: not examined  Membranes:  Intact  Uterine Activity: Frequency: Irregular  Fetal Heart Rate: Reactive  Accelerations: yes  Decelerations: variable  Uterine contractions: irregular    Assessment/Plan:  Reassuring fetal status. Will continue to observe overnight for labor.    To receive dose of Cytotec @ midnight and 6 AM    Monroe Rebolledo MD

## 2024-01-10 NOTE — PROGRESS NOTES
0720: Received care of Ms. Ricardo resting in bed and awake. Bedside report received from Jazmin Spencer RN.     0729: Shift assessment initiated and completed. Temp 98.1 and rates her abdominal discomfort at a \"6\".     0800: Dr. Oropeza in on rounds. POC reviewed by the MD.     0803: Ve performed by Dr. Oropeza. Cervix: 2-3cm/80%/-1. The following verbal orders received from Dr. Oropeza: patient can receive the epidural and initiate after the epidural procedure, Pitocin at 2ml/hr, increase by 2ml every 20 minutes up to 24ml or effective labor.     0807: L/R rate was increased to 999ml/hr via the pump prior to the epidural procedure. No infiltration noted.     0815: New bag of 1000ml L/R was added and continues at 999ml/hr via the pump. No infiltration noted.     0817: Dr. Carrera called to Labor and Delivery. Mickie Goodman, KATHY/Supervisor informed the MD of the order for the epidural. Dr. Carrera stated that he would send someone to perform the epidural.     0835: Jan Menjivar CRNA in Labor/Delivery then to the patient room to interview the patient for the epidural procedure.    0844: Epidural consent was signed by the patient and witnessed by Jan Menjivar CRNA /BRIGETTE Sevilla RN.     0849: Patient sitting on side of the bed prior to the epidural procedure.    0854: Epidural Time-out being performed. Jan Menjivar CRNA and the patient's female visitor present as instructed per Jan Menjivar RN.    0858: Patient's back being prepped prior to the epidural procedure. Ock-ou    0900: Epidural procedure initiated per Jan Menjivar CRNA. L/R bolus infused. New bag of 1000ml L/R hung and rate was decreased to 125ml/hr via the pump. No infiltration noted.     0903: Epidural catheter in place per Jan Menjivar CRNA.    0904: Test dose administered via the epidural catheter per Jan Menjivar CRNA.     0904: Test dose administered via the epidural catheter per Jan Menjivar CRNA.     0915: 2nd bolus dose administered via the epidural catheter per Jan  Nicole-pad and underpad were changed. High muro's position.     1552: Patient was instructed to push with contractions per Dr. Oropeza but ineffective pushing noted. Patient laboring down.     1702: The patient c/o having back pain and she rates her discomfort at a \"10\". Temp 98.8.     1705: The writer placed a call to DESIREE Chambers and informed the CRNA that the patient needed to be re-dosed. CRNA stated that he would come.     1710: DESIREE Chambers in the patient's room and re-dosing the patient's epidural catheter. See Anesthesia Record for medication given.     1737: Dr. Oropeza in the patient's room and instructed the patient to push with her contractions. Patient pushing ineffectively.    1745: Razo emptied and approximately 1325ml of clear yellow colored urine noted.     1751: Dr. Oropeza in the patient's room. Pushing resumed.Pitocin drip increased to 8ml per Dr. Oropeza's verbal order.     1753: Razo catheter removed by Minnie Bustamante RN and approximately 20ml of clear yellow colored urine noted in the drainage bag.     1800: L/R infused. No infiltration noted.     1804: Dr. Oropeza delivered a viable male infant vaginally and spontaneously over a 2nd degree laceration. Mouth and nose suctioned using the bulb syringe per Dr. Oropeza. FROM. Infant crying loud and lusty while being stimulated. The patient's mother and sister were present for the delivery. Skin to skin was initiated briefly. However, the patient requested that the infant be placed in the stabilet until the repairing of the 2nd degree laceration was completed.      1808: Dr. Oropeza delivered and inspected the placenta. Pitocin used for the Induction of labor was increased to 909ml/hr via the pump. No infiltration noted.     1817: 1% Lidocaine was used per Dr. Oropeza intradermal to repair the 2nd degree perineal laceration.     1819: Pitocin infusion was decreased to 87.3ml/hr via the pump. No  infiltration noted.     1825: Recovery period initiated. Temp 99.0 and

## 2024-01-11 LAB
BACTERIA SPEC CULT: NORMAL
BASOPHILS # BLD: 0 K/UL (ref 0–0.1)
BASOPHILS NFR BLD: 0 % (ref 0–1)
DIFFERENTIAL METHOD BLD: ABNORMAL
EOSINOPHIL # BLD: 0.1 K/UL (ref 0–0.4)
EOSINOPHIL NFR BLD: 0 % (ref 0–7)
ERYTHROCYTE [DISTWIDTH] IN BLOOD BY AUTOMATED COUNT: 13.3 % (ref 11.5–14.5)
HCT VFR BLD AUTO: 27.8 % (ref 35–47)
HGB BLD-MCNC: 9.3 G/DL (ref 11.5–16)
IMM GRANULOCYTES # BLD AUTO: 0.1 K/UL (ref 0–0.04)
IMM GRANULOCYTES NFR BLD AUTO: 1 % (ref 0–0.5)
LYMPHOCYTES # BLD: 3.4 K/UL (ref 0.8–3.5)
LYMPHOCYTES NFR BLD: 21 % (ref 12–49)
Lab: NORMAL
MCH RBC QN AUTO: 28.5 PG (ref 26–34)
MCHC RBC AUTO-ENTMCNC: 33.5 G/DL (ref 30–36.5)
MCV RBC AUTO: 85.3 FL (ref 80–99)
MONOCYTES # BLD: 1.4 K/UL (ref 0–1)
MONOCYTES NFR BLD: 9 % (ref 5–13)
NEUTS SEG # BLD: 11.5 K/UL (ref 1.8–8)
NEUTS SEG NFR BLD: 69 % (ref 32–75)
NRBC # BLD: 0 K/UL (ref 0–0.01)
NRBC BLD-RTO: 0 PER 100 WBC
PLATELET # BLD AUTO: 186 K/UL (ref 150–400)
PMV BLD AUTO: 10.7 FL (ref 8.9–12.9)
RBC # BLD AUTO: 3.26 M/UL (ref 3.8–5.2)
WBC # BLD AUTO: 16.5 K/UL (ref 3.6–11)

## 2024-01-11 PROCEDURE — 36415 COLL VENOUS BLD VENIPUNCTURE: CPT

## 2024-01-11 PROCEDURE — 6370000000 HC RX 637 (ALT 250 FOR IP): Performed by: OBSTETRICS & GYNECOLOGY

## 2024-01-11 PROCEDURE — 1120000000 HC RM PRIVATE OB

## 2024-01-11 PROCEDURE — 85025 COMPLETE CBC W/AUTO DIFF WBC: CPT

## 2024-01-11 PROCEDURE — 2580000003 HC RX 258: Performed by: OBSTETRICS & GYNECOLOGY

## 2024-01-11 RX ADMIN — SODIUM CHLORIDE, PRESERVATIVE FREE 10 ML: 5 INJECTION INTRAVENOUS at 09:42

## 2024-01-11 RX ADMIN — ACETAMINOPHEN 1000 MG: 500 TABLET ORAL at 01:56

## 2024-01-11 RX ADMIN — ACETAMINOPHEN 1000 MG: 500 TABLET ORAL at 23:44

## 2024-01-11 RX ADMIN — OXYCODONE HYDROCHLORIDE 10 MG: 10 TABLET ORAL at 16:11

## 2024-01-11 RX ADMIN — IBUPROFEN 800 MG: 800 TABLET ORAL at 06:55

## 2024-01-11 RX ADMIN — ACETAMINOPHEN 1000 MG: 500 TABLET ORAL at 09:36

## 2024-01-11 ASSESSMENT — PAIN DESCRIPTION - DESCRIPTORS: DESCRIPTORS: ACHING

## 2024-01-11 ASSESSMENT — PAIN DESCRIPTION - ORIENTATION: ORIENTATION: MID

## 2024-01-11 ASSESSMENT — PAIN - FUNCTIONAL ASSESSMENT: PAIN_FUNCTIONAL_ASSESSMENT: ACTIVITIES ARE NOT PREVENTED

## 2024-01-11 ASSESSMENT — PAIN DESCRIPTION - LOCATION: LOCATION: PERINEUM

## 2024-01-11 ASSESSMENT — PAIN SCALES - GENERAL: PAINLEVEL_OUTOF10: 7

## 2024-01-11 NOTE — PROGRESS NOTES
Nishi Ricardo     Information for the patient's :  Gerhard Ricardo [[id number]]   Vaginal, Spontaneous  Patient doing well without significant complaint.  Voiding without difficulty, normal lochia.    Vitals:    Vitals:    24 0201   BP: (!) 93/52   Pulse: (!) 106   Resp: 16   Temp: 97.8 °F (36.6 °C)   SpO2: 98%     Temp (24hrs), Av.4 °F (36.9 °C), Min:97.8 °F (36.6 °C), Max:99 °F (37.2 °C)          Exam:  Patient without distress.                Abdomen soft, fundus firm, nontender                Perineum with normal lochia noted.                Lower extremities are negative for swelling, cords or tenderness.    Labs:     Lab Results   Component Value Date/Time    WBC 16.5 2024 05:37 AM    WBC 11.0 2024 04:12 PM    WBC 9.0 12/15/2023 11:40 AM    WBC 9.9 10/23/2023 02:27 PM    WBC 10.2 08/15/2023 03:25 PM    HGB 9.3 2024 05:37 AM    HGB 11.0 2024 04:12 PM    HGB 11.2 12/15/2023 11:40 AM    HGB 10.3 10/23/2023 02:27 PM    HGB 11.1 08/15/2023 03:25 PM    HCT 27.8 2024 05:37 AM    HCT 32.9 2024 04:12 PM    HCT 34.7 12/15/2023 11:40 AM    HCT 30.9 10/23/2023 02:27 PM    HCT 33.0 08/15/2023 03:25 PM     2024 05:37 AM     2024 04:12 PM     12/15/2023 11:40 AM     10/23/2023 02:27 PM     08/15/2023 03:25 PM       Recent Results (from the past 24 hour(s))   CBC with Auto Differential    Collection Time: 24  5:37 AM   Result Value Ref Range    WBC 16.5 (H) 3.6 - 11.0 K/uL    RBC 3.26 (L) 3.80 - 5.20 M/uL    Hemoglobin 9.3 (L) 11.5 - 16.0 g/dL    Hematocrit 27.8 (L) 35.0 - 47.0 %    MCV 85.3 80.0 - 99.0 FL    MCH 28.5 26.0 - 34.0 PG    MCHC 33.5 30.0 - 36.5 g/dL    RDW 13.3 11.5 - 14.5 %    Platelets 186 150 - 400 K/uL    MPV 10.7 8.9 - 12.9 FL    Nucleated RBCs 0.0 0.0  WBC    nRBC 0.00 0.00 - 0.01 K/uL    Neutrophils % 69 32 - 75 %    Lymphocytes % 21 12 - 49 %    Monocytes % 9 5 - 13 %    Eosinophils % 0 0 - 7 %

## 2024-01-11 NOTE — PROGRESS NOTES
2222  Pt transferred to , room 324, via w/c in good condition.  Family with pt, bringing all pt belongings.  Pt holding infant during transfer.  PP RN and nursery RN were made aware of pt transfer.

## 2024-01-11 NOTE — ANESTHESIA POSTPROCEDURE EVALUATION
Department of Anesthesiology  Postprocedure Note    Patient: Nishi Ricardo  MRN: [id number]  YOB: 1995  Date of evaluation: 1/10/2024    Procedure Summary       Date: 01/10/24 Room / Location:     Anesthesia Start: 0854 Anesthesia Stop: 1804    Procedure: Labor Analgesia Diagnosis:     Scheduled Providers:  Responsible Provider: Louie Aaron MD    Anesthesia Type: epidural ASA Status: 2            Anesthesia Type: No value filed.    Milagros Phase I:      Milagros Phase II:      Anesthesia Post Evaluation    Patient location during evaluation: floor  Patient participation: complete - patient participated  Level of consciousness: awake  Pain score: 0  Airway patency: patent  Nausea & Vomiting: no nausea and no vomiting  Cardiovascular status: hemodynamically stable  Respiratory status: acceptable  Hydration status: stable  Multimodal analgesia pain management approach    No notable events documented.

## 2024-01-11 NOTE — PLAN OF CARE
2230- TRANSFER - IN REPORT    Verbal report received from Jazmin CHAVEZ on Nishi Ricardo  being received from Labor and Delivery for routine progression of patient care      Report consisted of patient's Situation, Background, Assessment and   Recommendations(SBAR).     Information from the following report(s) Nurse Handoff Report was reviewed with the receiving nurse.    Opportunity for questions and clarification was provided.      Assessment completed upon patient's arrival to unit and care assumed.      Pt Oriented to room. Pt updated regarding bedside report and hourly rounds. Instructed to call for Nurse before first time ambulating. PP and  care packet explained at bedside. Pt given Community resource guide and birth certificate sheet.  Pt's mother and sisters present at bedside. All questions answered at this time.       Problem: Vaginal Birth or  Section  Goal: Fetal and maternal status remain reassuring during the birth process  Description:  Birth OB-Pregnancy care plan goal which identifies if the fetal and maternal status remain reassuring during the birth process  Outcome: Progressing     Problem: Postpartum  Goal: Experiences normal postpartum course  Description:  Postpartum OB-Pregnancy care plan goal which identifies if the mother is experiencing a normal postpartum course  Outcome: Progressing  Goal: Appropriate maternal -  bonding  Description:  Postpartum OB-Pregnancy care plan goal which identifies if the mother and  are bonding appropriately  Outcome: Progressing  Goal: Establishment of infant feeding pattern  Description:  Postpartum OB-Pregnancy care plan goal which identifies if the mother is establishing a feeding pattern with their   Outcome: Progressing  Goal: Incisions, wounds, or drain sites healing without S/S of infection  Outcome: Progressing     Problem: Pain  Goal: Verbalizes/displays adequate comfort level or baseline comfort level  Outcome:

## 2024-01-11 NOTE — PLAN OF CARE
Problem: Postpartum  Goal: Experiences normal postpartum course  Description:  Postpartum OB-Pregnancy care plan goal which identifies if the mother is experiencing a normal postpartum course  2024 1103 by Yoan Escobar RN  Outcome: Progressing  2024 003 by Jocelyn Perez RN  Outcome: Progressing     Problem: Postpartum  Goal: Appropriate maternal -  bonding  Description:  Postpartum OB-Pregnancy care plan goal which identifies if the mother and  are bonding appropriately  2024 1103 by Yoan Escobar RN  Outcome: Progressing  2024 003 by Jocelyn Perez RN  Outcome: Progressing     Problem: Postpartum  Goal: Establishment of infant feeding pattern  Description:  Postpartum OB-Pregnancy care plan goal which identifies if the mother is establishing a feeding pattern with their   2024 1103 by Yoan Escobar RN  Outcome: Progressing  2024 by Jocelyn Perez RN  Outcome: Progressing     Problem: Pain  Goal: Verbalizes/displays adequate comfort level or baseline comfort level  2024 1103 by Yoan Escobar RN  Outcome: Progressing  2024 by Jocelyn Perez RN  Outcome: Progressing     Problem: Infection - Adult  Goal: Absence of infection at discharge  2024 by Yoan Escobar RN  Outcome: Progressing  2024 by Jocelyn Perez RN  Outcome: Progressing  Flowsheets (Taken 1/10/2024 2230)  Absence of infection at discharge:   Assess and monitor for signs and symptoms of infection   Monitor lab/diagnostic results     Problem: Safety - Adult  Goal: Free from fall injury  2024 by Yoan Escobar RN  Outcome: Progressing  2024 by Jocelyn Perez RN  Outcome: Progressing

## 2024-01-11 NOTE — PROGRESS NOTES
After pt.said to me she hadn't changed her pad since yesterday, I explained to pt.the importance of changing pad and doing diego-care every 3 to 4 hours or more if needed to prevent infection.   Pt.OOB to bathroom. Assisted pt.with diego-care.  Medicated spray provided. Encouraged pt.to ambulate.

## 2024-01-12 VITALS
TEMPERATURE: 97.7 F | HEART RATE: 90 BPM | DIASTOLIC BLOOD PRESSURE: 68 MMHG | RESPIRATION RATE: 18 BRPM | OXYGEN SATURATION: 100 % | SYSTOLIC BLOOD PRESSURE: 110 MMHG

## 2024-01-12 PROCEDURE — 6370000000 HC RX 637 (ALT 250 FOR IP): Performed by: OBSTETRICS & GYNECOLOGY

## 2024-01-12 RX ORDER — PSEUDOEPHEDRINE HCL 30 MG
100 TABLET ORAL 2 TIMES DAILY
Qty: 60 CAPSULE | Refills: 0 | Status: SHIPPED | OUTPATIENT
Start: 2024-01-12

## 2024-01-12 RX ADMIN — OXYCODONE HYDROCHLORIDE 5 MG: 5 TABLET ORAL at 07:15

## 2024-01-12 RX ADMIN — IBUPROFEN 800 MG: 800 TABLET ORAL at 07:15

## 2024-01-12 RX ADMIN — IBUPROFEN 800 MG: 800 TABLET ORAL at 14:59

## 2024-01-12 ASSESSMENT — PAIN DESCRIPTION - ORIENTATION: ORIENTATION: LOWER

## 2024-01-12 ASSESSMENT — PAIN SCALES - GENERAL
PAINLEVEL_OUTOF10: 7
PAINLEVEL_OUTOF10: 0

## 2024-01-12 ASSESSMENT — PAIN - FUNCTIONAL ASSESSMENT: PAIN_FUNCTIONAL_ASSESSMENT: ACTIVITIES ARE NOT PREVENTED

## 2024-01-12 ASSESSMENT — PAIN DESCRIPTION - LOCATION: LOCATION: VAGINA

## 2024-01-12 ASSESSMENT — PAIN DESCRIPTION - DESCRIPTORS: DESCRIPTORS: ACHING

## 2024-01-12 NOTE — PLAN OF CARE
Problem: Postpartum  Goal: Experiences normal postpartum course  Description:  Postpartum OB-Pregnancy care plan goal which identifies if the mother is experiencing a normal postpartum course  2024 by Yoan Escobar RN  Outcome: Progressing  2024 by Jocelyn Perez RN  Outcome: Progressing     Problem: Postpartum  Goal: Appropriate maternal -  bonding  Description:  Postpartum OB-Pregnancy care plan goal which identifies if the mother and  are bonding appropriately  2024 by Yoan Escobar RN  Outcome: Progressing  2024 by Jocelyn Perez RN  Outcome: Progressing     Problem: Postpartum  Goal: Establishment of infant feeding pattern  Description:  Postpartum OB-Pregnancy care plan goal which identifies if the mother is establishing a feeding pattern with their   2024 by Yoan Escobar RN  Outcome: Progressing  2024 by Jocelyn Perez RN  Outcome: Progressing     Problem: Postpartum  Goal: Incisions, wounds, or drain sites healing without S/S of infection  2024 by Yoan Escobar RN  Outcome: Progressing  2024 by Jocelyn Perez RN  Outcome: Progressing  Flowsheets (Taken 2024)  Incisions, Wounds, or Drain Sites Healing Without Sign and Symptoms of Infection: ADMISSION and DAILY: Assess and document risk factors for pressure ulcer development

## 2024-01-12 NOTE — LACTATION NOTE
This note was copied from a baby's chart.  I worked with this mother this morning at 0925 to breasst feed her baby. She has very flat nipples and we are unable to compress the breast and baby maintain a latch. I gve her a 20mm shield to use and baby latched and nursed well with it. I encouraged her to feed with the shield and pump 5 min after to supply milk for supplement. Mother pumped and got 4 mll of colostrum to use in the next feeding. Grandmother bottle fed formula to baby after nursing.  Mother tells me that at 1200 she fed baby the 4ml of colostrum and fed 35ml of formula. She  did not bf nor pump.  I encouraged her to bf and pump in order to stimulate her breasts to get her milk supply in.  I gave her a bf book with lactaion line and latch clinic information.  Mother tells me she has an electric pump at home.

## 2024-01-12 NOTE — DISCHARGE SUMMARY
OBG GYN Discharge Summary     Patient ID:  Nishi Ricardo  [id number]  28 y.o.  1995    Admit date: 2024    Discharge date and time: 24    Admitting Physician: Aden Blancas MD     Discharge Physician: Aden Blancas MD    Admission Diagnoses: 39 weeks gestation of pregnancy [Z3A.39]    Hospital Course: Nishi Ricardo had unremarkeable progressive recovery.  and Eating, ambulating, and voiding in a routine manner.    Significant Diagnostic Studies: No results found for this or any previous visit (from the past 24 hour(s)).    Procedures:      Discharge Exam:  /73   Pulse (!) 105   Temp 98.2 °F (36.8 °C) (Oral)   Resp 18   SpO2 99%   Breastfeeding Unknown      Patient Instructions:   Current Discharge Medication List        START taking these medications    Details   ibuprofen (ADVIL;MOTRIN) 800 MG tablet Take 1 tablet by mouth every 8 hours as needed for Pain  Qty: 30 tablet, Refills: 0      oxyCODONE (ROXICODONE) 5 MG immediate release tablet Take 1 tablet by mouth every 4 hours as needed for Pain for up to 2 days. Max Daily Amount: 30 mg  Qty: 9 tablet, Refills: 0    Comments: Reduce doses taken as pain becomes manageable  Associated Diagnoses:  (normal spontaneous vaginal delivery); Second degree perineal laceration during delivery           CONTINUE these medications which have NOT CHANGED    Details   vitamin D3 (CHOLECALCIFEROL) 25 MCG (1000 UT) TABS tablet Take 1 tablet by mouth daily  Qty: 90 tablet, Refills: 1      omeprazole (PRILOSEC) 20 MG delayed release capsule Take 1 capsule by mouth every morning (before breakfast)  Qty: 30 capsule, Refills: 0    Associated Diagnoses: Heartburn      vitamin D 25 MCG (1000 UT) CAPS Take by mouth      Prenatal Vit-Fe Fumarate-FA (PRENATAL VITAMIN) 27-0.8 MG TABS Take 1 tablet by mouth daily  Qty: 90 tablet, Refills: 5    Associated Diagnoses: Prenatal care, antepartum            Activity: physical activity is restricted per discharge

## 2024-01-12 NOTE — PLAN OF CARE
0330- Pt encouraged to change diego pad. Small lochia noted on pad that has not been changed \"in a few hours\" per patient. Pt stated she does not change her pad if she does not feel the need to use the bathroom. Pt educated even if there is no need to use the bathroom that the diego pad should be changed every 3-4 hours to prevent infection. Pt stated \"okay\".       Problem: Vaginal Birth or  Section  Goal: Fetal and maternal status remain reassuring during the birth process  Description:  Birth OB-Pregnancy care plan goal which identifies if the fetal and maternal status remain reassuring during the birth process  Outcome: Progressing     Problem: Postpartum  Goal: Experiences normal postpartum course  Description:  Postpartum OB-Pregnancy care plan goal which identifies if the mother is experiencing a normal postpartum course  2024 by Jocelyn Perez RN  Outcome: Progressing  2024 by Yoan Escobar RN  Outcome: Progressing  Goal: Appropriate maternal -  bonding  Description:  Postpartum OB-Pregnancy care plan goal which identifies if the mother and  are bonding appropriately  2024 by Jocelyn Perez RN  Outcome: Progressing  2024 1103 by Yoan Escobar RN  Outcome: Progressing  Goal: Establishment of infant feeding pattern  Description:  Postpartum OB-Pregnancy care plan goal which identifies if the mother is establishing a feeding pattern with their   2024 by Jocelyn Perez RN  Outcome: Progressing  2024 by Yoan Escobar RN  Outcome: Progressing  Goal: Incisions, wounds, or drain sites healing without S/S of infection  Outcome: Progressing  Flowsheets (Taken 2024)  Incisions, Wounds, or Drain Sites Healing Without Sign and Symptoms of Infection: ADMISSION and DAILY: Assess and document risk factors for pressure ulcer development     Problem: Pain  Goal: Verbalizes/displays

## 2024-02-12 ENCOUNTER — APPOINTMENT (OUTPATIENT)
Facility: HOSPITAL | Age: 29
End: 2024-02-12
Payer: COMMERCIAL

## 2024-02-12 ENCOUNTER — ANESTHESIA EVENT (OUTPATIENT)
Facility: HOSPITAL | Age: 29
End: 2024-02-12
Payer: COMMERCIAL

## 2024-02-12 ENCOUNTER — HOSPITAL ENCOUNTER (OUTPATIENT)
Facility: HOSPITAL | Age: 29
Setting detail: OBSERVATION
LOS: 1 days | Discharge: HOME | End: 2024-02-13
Attending: EMERGENCY MEDICINE | Admitting: SURGERY
Payer: COMMERCIAL

## 2024-02-12 ENCOUNTER — ANESTHESIA (OUTPATIENT)
Facility: HOSPITAL | Age: 29
End: 2024-02-12
Payer: COMMERCIAL

## 2024-02-12 DIAGNOSIS — K37 APPENDICITIS, UNSPECIFIED APPENDICITIS TYPE: ICD-10-CM

## 2024-02-12 DIAGNOSIS — K35.890 OTHER ACUTE APPENDICITIS: Primary | ICD-10-CM

## 2024-02-12 PROBLEM — K35.80 ACUTE APPENDICITIS: Status: ACTIVE | Noted: 2024-02-12

## 2024-02-12 LAB
ALBUMIN SERPL-MCNC: 3.5 G/DL (ref 3.5–5)
ALBUMIN/GLOB SERPL: 0.9 (ref 1.1–2.2)
ALP SERPL-CCNC: 111 U/L (ref 45–117)
ALT SERPL-CCNC: 21 U/L (ref 12–78)
ANION GAP SERPL CALC-SCNC: 6 MMOL/L (ref 5–15)
APPEARANCE UR: CLEAR
AST SERPL W P-5'-P-CCNC: 7 U/L (ref 15–37)
BACTERIA URNS QL MICRO: NEGATIVE /HPF
BASOPHILS # BLD: 0 K/UL (ref 0–0.1)
BASOPHILS NFR BLD: 0 % (ref 0–1)
BILIRUB SERPL-MCNC: 0.4 MG/DL (ref 0.2–1)
BILIRUB UR QL: NEGATIVE
BUN SERPL-MCNC: 7 MG/DL (ref 6–20)
BUN/CREAT SERPL: 8 (ref 12–20)
CA-I BLD-MCNC: 9.2 MG/DL (ref 8.5–10.1)
CHLORIDE SERPL-SCNC: 106 MMOL/L (ref 97–108)
CO2 SERPL-SCNC: 24 MMOL/L (ref 21–32)
COLOR UR: ABNORMAL
CREAT SERPL-MCNC: 0.86 MG/DL (ref 0.55–1.02)
DIFFERENTIAL METHOD BLD: ABNORMAL
EKG ATRIAL RATE: 94 BPM
EKG DIAGNOSIS: NORMAL
EKG P AXIS: 37 DEGREES
EKG P-R INTERVAL: 146 MS
EKG Q-T INTERVAL: 348 MS
EKG QRS DURATION: 84 MS
EKG QTC CALCULATION (BAZETT): 435 MS
EKG R AXIS: 31 DEGREES
EKG T AXIS: 11 DEGREES
EKG VENTRICULAR RATE: 94 BPM
EOSINOPHIL # BLD: 0 K/UL (ref 0–0.4)
EOSINOPHIL NFR BLD: 0 % (ref 0–7)
EPITH CASTS URNS QL MICRO: ABNORMAL /LPF
ERYTHROCYTE [DISTWIDTH] IN BLOOD BY AUTOMATED COUNT: 12.5 % (ref 11.5–14.5)
FLUAV AG NPH QL IA: NEGATIVE
FLUBV AG NOSE QL IA: NEGATIVE
GLOBULIN SER CALC-MCNC: 4.1 G/DL (ref 2–4)
GLUCOSE SERPL-MCNC: 143 MG/DL (ref 65–100)
GLUCOSE UR STRIP.AUTO-MCNC: NEGATIVE MG/DL
HCG SERPL QL: NEGATIVE
HCG SERPL-ACNC: <1 MIU/ML (ref 0–6)
HCT VFR BLD AUTO: 36 % (ref 35–47)
HGB BLD-MCNC: 11.3 G/DL (ref 11.5–16)
HGB UR QL STRIP: ABNORMAL
IMM GRANULOCYTES # BLD AUTO: 0.1 K/UL (ref 0–0.04)
IMM GRANULOCYTES NFR BLD AUTO: 1 % (ref 0–0.5)
KETONES UR QL STRIP.AUTO: NEGATIVE MG/DL
LEUKOCYTE ESTERASE UR QL STRIP.AUTO: NEGATIVE
LIPASE SERPL-CCNC: 29 U/L (ref 13–75)
LYMPHOCYTES # BLD: 2.2 K/UL (ref 0.8–3.5)
LYMPHOCYTES NFR BLD: 10 % (ref 12–49)
MCH RBC QN AUTO: 27 PG (ref 26–34)
MCHC RBC AUTO-ENTMCNC: 31.4 G/DL (ref 30–36.5)
MCV RBC AUTO: 86.1 FL (ref 80–99)
MONOCYTES # BLD: 1.9 K/UL (ref 0–1)
MONOCYTES NFR BLD: 9 % (ref 5–13)
NEUTS SEG # BLD: 17.3 K/UL (ref 1.8–8)
NEUTS SEG NFR BLD: 80 % (ref 32–75)
NITRITE UR QL STRIP.AUTO: NEGATIVE
NRBC # BLD: 0 K/UL (ref 0–0.01)
NRBC BLD-RTO: 0 PER 100 WBC
PH UR STRIP: 6 (ref 5–8)
PLATELET # BLD AUTO: 304 K/UL (ref 150–400)
PMV BLD AUTO: 10.7 FL (ref 8.9–12.9)
POTASSIUM SERPL-SCNC: 4 MMOL/L (ref 3.5–5.1)
PROT SERPL-MCNC: 7.6 G/DL (ref 6.4–8.2)
PROT UR STRIP-MCNC: NEGATIVE MG/DL
RBC # BLD AUTO: 4.18 M/UL (ref 3.8–5.2)
RBC #/AREA URNS HPF: ABNORMAL /HPF (ref 0–5)
SARS-COV-2 RDRP RESP QL NAA+PROBE: NOT DETECTED
SODIUM SERPL-SCNC: 136 MMOL/L (ref 136–145)
SP GR UR REFRACTOMETRY: <1.005 (ref 1–1.03)
URINE CULTURE IF INDICATED: ABNORMAL
UROBILINOGEN UR QL STRIP.AUTO: 0.1 EU/DL (ref 0.1–1)
WBC # BLD AUTO: 21.6 K/UL (ref 3.6–11)
WBC URNS QL MICRO: ABNORMAL /HPF (ref 0–4)

## 2024-02-12 PROCEDURE — 2580000003 HC RX 258: Performed by: SURGERY

## 2024-02-12 PROCEDURE — 6360000002 HC RX W HCPCS: Performed by: SURGERY

## 2024-02-12 PROCEDURE — 84703 CHORIONIC GONADOTROPIN ASSAY: CPT

## 2024-02-12 PROCEDURE — 74177 CT ABD & PELVIS W/CONTRAST: CPT

## 2024-02-12 PROCEDURE — 2580000003 HC RX 258: Performed by: ANESTHESIOLOGY

## 2024-02-12 PROCEDURE — 2720000010 HC SURG SUPPLY STERILE: Performed by: SURGERY

## 2024-02-12 PROCEDURE — 80053 COMPREHEN METABOLIC PANEL: CPT

## 2024-02-12 PROCEDURE — 6360000002 HC RX W HCPCS: Performed by: EMERGENCY MEDICINE

## 2024-02-12 PROCEDURE — 7100000000 HC PACU RECOVERY - FIRST 15 MIN: Performed by: SURGERY

## 2024-02-12 PROCEDURE — 99285 EMERGENCY DEPT VISIT HI MDM: CPT

## 2024-02-12 PROCEDURE — 2500000003 HC RX 250 WO HCPCS: Performed by: NURSE ANESTHETIST, CERTIFIED REGISTERED

## 2024-02-12 PROCEDURE — 7100000001 HC PACU RECOVERY - ADDTL 15 MIN: Performed by: SURGERY

## 2024-02-12 PROCEDURE — 6360000004 HC RX CONTRAST MEDICATION: Performed by: EMERGENCY MEDICINE

## 2024-02-12 PROCEDURE — 2500000003 HC RX 250 WO HCPCS: Performed by: ANESTHESIOLOGY

## 2024-02-12 PROCEDURE — 84702 CHORIONIC GONADOTROPIN TEST: CPT

## 2024-02-12 PROCEDURE — 96366 THER/PROPH/DIAG IV INF ADDON: CPT

## 2024-02-12 PROCEDURE — 6360000002 HC RX W HCPCS: Performed by: ANESTHESIOLOGY

## 2024-02-12 PROCEDURE — 96375 TX/PRO/DX INJ NEW DRUG ADDON: CPT

## 2024-02-12 PROCEDURE — 93005 ELECTROCARDIOGRAM TRACING: CPT | Performed by: EMERGENCY MEDICINE

## 2024-02-12 PROCEDURE — 2580000003 HC RX 258: Performed by: EMERGENCY MEDICINE

## 2024-02-12 PROCEDURE — 2709999900 HC NON-CHARGEABLE SUPPLY: Performed by: SURGERY

## 2024-02-12 PROCEDURE — 88304 TISSUE EXAM BY PATHOLOGIST: CPT

## 2024-02-12 PROCEDURE — 96361 HYDRATE IV INFUSION ADD-ON: CPT

## 2024-02-12 PROCEDURE — 3700000000 HC ANESTHESIA ATTENDED CARE: Performed by: SURGERY

## 2024-02-12 PROCEDURE — G0378 HOSPITAL OBSERVATION PER HR: HCPCS

## 2024-02-12 PROCEDURE — 99222 1ST HOSP IP/OBS MODERATE 55: CPT | Performed by: SURGERY

## 2024-02-12 PROCEDURE — 44970 LAPAROSCOPY APPENDECTOMY: CPT | Performed by: SURGERY

## 2024-02-12 PROCEDURE — 83690 ASSAY OF LIPASE: CPT

## 2024-02-12 PROCEDURE — 85025 COMPLETE CBC W/AUTO DIFF WBC: CPT

## 2024-02-12 PROCEDURE — 3600000012 HC SURGERY LEVEL 2 ADDTL 15MIN: Performed by: SURGERY

## 2024-02-12 PROCEDURE — 6370000000 HC RX 637 (ALT 250 FOR IP): Performed by: SURGERY

## 2024-02-12 PROCEDURE — 87635 SARS-COV-2 COVID-19 AMP PRB: CPT

## 2024-02-12 PROCEDURE — 87804 INFLUENZA ASSAY W/OPTIC: CPT

## 2024-02-12 PROCEDURE — 3700000001 HC ADD 15 MINUTES (ANESTHESIA): Performed by: SURGERY

## 2024-02-12 PROCEDURE — 81001 URINALYSIS AUTO W/SCOPE: CPT

## 2024-02-12 PROCEDURE — 6360000002 HC RX W HCPCS: Performed by: NURSE ANESTHETIST, CERTIFIED REGISTERED

## 2024-02-12 PROCEDURE — 96365 THER/PROPH/DIAG IV INF INIT: CPT

## 2024-02-12 PROCEDURE — 3600000002 HC SURGERY LEVEL 2 BASE: Performed by: SURGERY

## 2024-02-12 RX ORDER — FENTANYL CITRATE 50 UG/ML
50 INJECTION, SOLUTION INTRAMUSCULAR; INTRAVENOUS EVERY 5 MIN PRN
Status: DISCONTINUED | OUTPATIENT
Start: 2024-02-12 | End: 2024-02-13 | Stop reason: HOSPADM

## 2024-02-12 RX ORDER — DEXTROSE MONOHYDRATE 100 MG/ML
INJECTION, SOLUTION INTRAVENOUS CONTINUOUS PRN
Status: DISCONTINUED | OUTPATIENT
Start: 2024-02-12 | End: 2024-02-13 | Stop reason: HOSPADM

## 2024-02-12 RX ORDER — PROPOFOL 10 MG/ML
INJECTION, EMULSION INTRAVENOUS PRN
Status: DISCONTINUED | OUTPATIENT
Start: 2024-02-12 | End: 2024-02-12 | Stop reason: SDUPTHER

## 2024-02-12 RX ORDER — BUPIVACAINE HYDROCHLORIDE 5 MG/ML
INJECTION, SOLUTION PERINEURAL PRN
Status: DISCONTINUED | OUTPATIENT
Start: 2024-02-12 | End: 2024-02-12 | Stop reason: ALTCHOICE

## 2024-02-12 RX ORDER — ONDANSETRON 2 MG/ML
INJECTION INTRAMUSCULAR; INTRAVENOUS PRN
Status: DISCONTINUED | OUTPATIENT
Start: 2024-02-12 | End: 2024-02-12 | Stop reason: SDUPTHER

## 2024-02-12 RX ORDER — IPRATROPIUM BROMIDE AND ALBUTEROL SULFATE 2.5; .5 MG/3ML; MG/3ML
1 SOLUTION RESPIRATORY (INHALATION)
Status: DISCONTINUED | OUTPATIENT
Start: 2024-02-12 | End: 2024-02-13 | Stop reason: HOSPADM

## 2024-02-12 RX ORDER — ENOXAPARIN SODIUM 100 MG/ML
40 INJECTION SUBCUTANEOUS DAILY
Status: DISCONTINUED | OUTPATIENT
Start: 2024-02-12 | End: 2024-02-13 | Stop reason: HOSPADM

## 2024-02-12 RX ORDER — METOCLOPRAMIDE HYDROCHLORIDE 5 MG/ML
10 INJECTION INTRAMUSCULAR; INTRAVENOUS
Status: DISCONTINUED | OUTPATIENT
Start: 2024-02-12 | End: 2024-02-13 | Stop reason: HOSPADM

## 2024-02-12 RX ORDER — POLYETHYLENE GLYCOL 3350 17 G/17G
17 POWDER, FOR SOLUTION ORAL DAILY PRN
Status: DISCONTINUED | OUTPATIENT
Start: 2024-02-12 | End: 2024-02-13 | Stop reason: HOSPADM

## 2024-02-12 RX ORDER — SODIUM CHLORIDE, SODIUM LACTATE, POTASSIUM CHLORIDE, CALCIUM CHLORIDE 600; 310; 30; 20 MG/100ML; MG/100ML; MG/100ML; MG/100ML
INJECTION, SOLUTION INTRAVENOUS ONCE
Status: COMPLETED | OUTPATIENT
Start: 2024-02-12 | End: 2024-02-13

## 2024-02-12 RX ORDER — ROCURONIUM BROMIDE 10 MG/ML
INJECTION, SOLUTION INTRAVENOUS PRN
Status: DISCONTINUED | OUTPATIENT
Start: 2024-02-12 | End: 2024-02-12 | Stop reason: SDUPTHER

## 2024-02-12 RX ORDER — ONDANSETRON 2 MG/ML
4 INJECTION INTRAMUSCULAR; INTRAVENOUS
Status: DISCONTINUED | OUTPATIENT
Start: 2024-02-12 | End: 2024-02-13 | Stop reason: HOSPADM

## 2024-02-12 RX ORDER — METOCLOPRAMIDE HYDROCHLORIDE 5 MG/ML
INJECTION INTRAMUSCULAR; INTRAVENOUS PRN
Status: DISCONTINUED | OUTPATIENT
Start: 2024-02-12 | End: 2024-02-12

## 2024-02-12 RX ORDER — SODIUM CHLORIDE 0.9 % (FLUSH) 0.9 %
5-40 SYRINGE (ML) INJECTION EVERY 12 HOURS SCHEDULED
Status: DISCONTINUED | OUTPATIENT
Start: 2024-02-12 | End: 2024-02-13 | Stop reason: HOSPADM

## 2024-02-12 RX ORDER — ACETAMINOPHEN 500 MG
250 TABLET ORAL EVERY 6 HOURS PRN
Status: ON HOLD | COMMUNITY
End: 2024-02-13 | Stop reason: HOSPADM

## 2024-02-12 RX ORDER — ONDANSETRON 2 MG/ML
4 INJECTION INTRAMUSCULAR; INTRAVENOUS EVERY 6 HOURS PRN
Status: DISCONTINUED | OUTPATIENT
Start: 2024-02-12 | End: 2024-02-13 | Stop reason: HOSPADM

## 2024-02-12 RX ORDER — MEPERIDINE HYDROCHLORIDE 25 MG/ML
12.5 INJECTION INTRAMUSCULAR; INTRAVENOUS; SUBCUTANEOUS EVERY 5 MIN PRN
Status: DISCONTINUED | OUTPATIENT
Start: 2024-02-12 | End: 2024-02-13 | Stop reason: HOSPADM

## 2024-02-12 RX ORDER — ONDANSETRON 2 MG/ML
4 INJECTION INTRAMUSCULAR; INTRAVENOUS ONCE
Status: DISCONTINUED | OUTPATIENT
Start: 2024-02-12 | End: 2024-02-13 | Stop reason: HOSPADM

## 2024-02-12 RX ORDER — LIDOCAINE HYDROCHLORIDE 20 MG/ML
INJECTION, SOLUTION EPIDURAL; INFILTRATION; INTRACAUDAL; PERINEURAL PRN
Status: DISCONTINUED | OUTPATIENT
Start: 2024-02-12 | End: 2024-02-12 | Stop reason: SDUPTHER

## 2024-02-12 RX ORDER — ONDANSETRON 4 MG/1
4 TABLET, ORALLY DISINTEGRATING ORAL EVERY 8 HOURS PRN
Status: DISCONTINUED | OUTPATIENT
Start: 2024-02-12 | End: 2024-02-13 | Stop reason: HOSPADM

## 2024-02-12 RX ORDER — SODIUM CHLORIDE, SODIUM LACTATE, POTASSIUM CHLORIDE, CALCIUM CHLORIDE 600; 310; 30; 20 MG/100ML; MG/100ML; MG/100ML; MG/100ML
INJECTION, SOLUTION INTRAVENOUS CONTINUOUS PRN
Status: DISCONTINUED | OUTPATIENT
Start: 2024-02-12 | End: 2024-02-12 | Stop reason: SDUPTHER

## 2024-02-12 RX ORDER — SODIUM CHLORIDE 0.9 % (FLUSH) 0.9 %
5-40 SYRINGE (ML) INJECTION PRN
Status: DISCONTINUED | OUTPATIENT
Start: 2024-02-12 | End: 2024-02-13 | Stop reason: HOSPADM

## 2024-02-12 RX ORDER — HYDRALAZINE HYDROCHLORIDE 20 MG/ML
10 INJECTION INTRAMUSCULAR; INTRAVENOUS
Status: DISCONTINUED | OUTPATIENT
Start: 2024-02-12 | End: 2024-02-13 | Stop reason: HOSPADM

## 2024-02-12 RX ORDER — SODIUM CHLORIDE 9 MG/ML
INJECTION, SOLUTION INTRAVENOUS PRN
Status: DISCONTINUED | OUTPATIENT
Start: 2024-02-12 | End: 2024-02-13 | Stop reason: HOSPADM

## 2024-02-12 RX ORDER — FAMOTIDINE 10 MG/ML
INJECTION, SOLUTION INTRAVENOUS PRN
Status: DISCONTINUED | OUTPATIENT
Start: 2024-02-12 | End: 2024-02-12 | Stop reason: SDUPTHER

## 2024-02-12 RX ORDER — HYDROMORPHONE HYDROCHLORIDE 1 MG/ML
0.5 INJECTION, SOLUTION INTRAMUSCULAR; INTRAVENOUS; SUBCUTANEOUS EVERY 5 MIN PRN
Status: DISCONTINUED | OUTPATIENT
Start: 2024-02-12 | End: 2024-02-13 | Stop reason: HOSPADM

## 2024-02-12 RX ORDER — LIDOCAINE 4 G/G
1 PATCH TOPICAL AS NEEDED
Status: DISCONTINUED | OUTPATIENT
Start: 2024-02-12 | End: 2024-02-13 | Stop reason: HOSPADM

## 2024-02-12 RX ORDER — POTASSIUM CHLORIDE 7.45 MG/ML
10 INJECTION INTRAVENOUS PRN
Status: DISCONTINUED | OUTPATIENT
Start: 2024-02-12 | End: 2024-02-13 | Stop reason: HOSPADM

## 2024-02-12 RX ORDER — SODIUM CHLORIDE 9 MG/ML
INJECTION, SOLUTION INTRAVENOUS CONTINUOUS
Status: DISCONTINUED | OUTPATIENT
Start: 2024-02-12 | End: 2024-02-13 | Stop reason: HOSPADM

## 2024-02-12 RX ORDER — FENTANYL CITRATE 50 UG/ML
INJECTION, SOLUTION INTRAMUSCULAR; INTRAVENOUS PRN
Status: DISCONTINUED | OUTPATIENT
Start: 2024-02-12 | End: 2024-02-12 | Stop reason: SDUPTHER

## 2024-02-12 RX ORDER — DIPHENHYDRAMINE HYDROCHLORIDE 50 MG/ML
12.5 INJECTION INTRAMUSCULAR; INTRAVENOUS
Status: DISCONTINUED | OUTPATIENT
Start: 2024-02-12 | End: 2024-02-13 | Stop reason: HOSPADM

## 2024-02-12 RX ORDER — MAGNESIUM SULFATE IN WATER 40 MG/ML
2000 INJECTION, SOLUTION INTRAVENOUS PRN
Status: DISCONTINUED | OUTPATIENT
Start: 2024-02-12 | End: 2024-02-13 | Stop reason: HOSPADM

## 2024-02-12 RX ORDER — SUCCINYLCHOLINE/SOD CL,ISO/PF 200MG/10ML
SYRINGE (ML) INTRAVENOUS PRN
Status: DISCONTINUED | OUTPATIENT
Start: 2024-02-12 | End: 2024-02-12 | Stop reason: SDUPTHER

## 2024-02-12 RX ORDER — ONDANSETRON 4 MG/1
4 TABLET, ORALLY DISINTEGRATING ORAL EVERY 8 HOURS PRN
Qty: 12 TABLET | Refills: 0 | Status: SHIPPED | OUTPATIENT
Start: 2024-02-12 | End: 2024-02-16

## 2024-02-12 RX ORDER — HYDROMORPHONE HYDROCHLORIDE 1 MG/ML
1 INJECTION, SOLUTION INTRAMUSCULAR; INTRAVENOUS; SUBCUTANEOUS EVERY 4 HOURS PRN
Status: DISCONTINUED | OUTPATIENT
Start: 2024-02-12 | End: 2024-02-13 | Stop reason: HOSPADM

## 2024-02-12 RX ORDER — OXYCODONE HYDROCHLORIDE AND ACETAMINOPHEN 5; 325 MG/1; MG/1
1 TABLET ORAL EVERY 4 HOURS PRN
Status: DISCONTINUED | OUTPATIENT
Start: 2024-02-12 | End: 2024-02-13 | Stop reason: HOSPADM

## 2024-02-12 RX ORDER — PROPOFOL 10 MG/ML
INJECTION, EMULSION INTRAVENOUS CONTINUOUS PRN
Status: DISCONTINUED | OUTPATIENT
Start: 2024-02-12 | End: 2024-02-12 | Stop reason: SDUPTHER

## 2024-02-12 RX ORDER — MIDAZOLAM HYDROCHLORIDE 1 MG/ML
INJECTION INTRAMUSCULAR; INTRAVENOUS PRN
Status: DISCONTINUED | OUTPATIENT
Start: 2024-02-12 | End: 2024-02-12 | Stop reason: SDUPTHER

## 2024-02-12 RX ORDER — ACETAMINOPHEN 325 MG/1
650 TABLET ORAL EVERY 6 HOURS PRN
Status: DISCONTINUED | OUTPATIENT
Start: 2024-02-12 | End: 2024-02-13 | Stop reason: HOSPADM

## 2024-02-12 RX ORDER — DEXAMETHASONE SODIUM PHOSPHATE 4 MG/ML
INJECTION, SOLUTION INTRA-ARTICULAR; INTRALESIONAL; INTRAMUSCULAR; INTRAVENOUS; SOFT TISSUE PRN
Status: DISCONTINUED | OUTPATIENT
Start: 2024-02-12 | End: 2024-02-12 | Stop reason: SDUPTHER

## 2024-02-12 RX ORDER — LABETALOL HYDROCHLORIDE 5 MG/ML
10 INJECTION, SOLUTION INTRAVENOUS
Status: DISCONTINUED | OUTPATIENT
Start: 2024-02-12 | End: 2024-02-13 | Stop reason: HOSPADM

## 2024-02-12 RX ORDER — KETOROLAC TROMETHAMINE 15 MG/ML
15 INJECTION, SOLUTION INTRAMUSCULAR; INTRAVENOUS ONCE
Status: COMPLETED | OUTPATIENT
Start: 2024-02-12 | End: 2024-02-12

## 2024-02-12 RX ORDER — LORAZEPAM 2 MG/ML
0.5 INJECTION INTRAMUSCULAR
Status: DISCONTINUED | OUTPATIENT
Start: 2024-02-12 | End: 2024-02-13 | Stop reason: HOSPADM

## 2024-02-12 RX ORDER — OXYCODONE HYDROCHLORIDE 5 MG/1
5 TABLET ORAL PRN
Status: ACTIVE | OUTPATIENT
Start: 2024-02-12 | End: 2024-02-12

## 2024-02-12 RX ORDER — 0.9 % SODIUM CHLORIDE 0.9 %
1000 INTRAVENOUS SOLUTION INTRAVENOUS ONCE
Status: COMPLETED | OUTPATIENT
Start: 2024-02-12 | End: 2024-02-12

## 2024-02-12 RX ORDER — POTASSIUM CHLORIDE 20 MEQ/1
40 TABLET, EXTENDED RELEASE ORAL PRN
Status: DISCONTINUED | OUTPATIENT
Start: 2024-02-12 | End: 2024-02-13 | Stop reason: HOSPADM

## 2024-02-12 RX ORDER — ACETAMINOPHEN 650 MG/1
650 SUPPOSITORY RECTAL EVERY 6 HOURS PRN
Status: DISCONTINUED | OUTPATIENT
Start: 2024-02-12 | End: 2024-02-13 | Stop reason: HOSPADM

## 2024-02-12 RX ORDER — GLUCAGON 1 MG/ML
1 KIT INJECTION PRN
Status: DISCONTINUED | OUTPATIENT
Start: 2024-02-12 | End: 2024-02-13 | Stop reason: HOSPADM

## 2024-02-12 RX ORDER — OXYCODONE HYDROCHLORIDE 5 MG/1
10 TABLET ORAL PRN
Status: ACTIVE | OUTPATIENT
Start: 2024-02-12 | End: 2024-02-12

## 2024-02-12 RX ADMIN — SODIUM CHLORIDE, PRESERVATIVE FREE 10 ML: 5 INJECTION INTRAVENOUS at 21:39

## 2024-02-12 RX ADMIN — PROPOFOL 140 MG: 10 INJECTION, EMULSION INTRAVENOUS at 16:39

## 2024-02-12 RX ADMIN — ACETAMINOPHEN 650 MG: 325 TABLET ORAL at 21:53

## 2024-02-12 RX ADMIN — SODIUM CHLORIDE: 9 INJECTION, SOLUTION INTRAVENOUS at 14:16

## 2024-02-12 RX ADMIN — KETOROLAC TROMETHAMINE 15 MG: 15 INJECTION, SOLUTION INTRAMUSCULAR; INTRAVENOUS at 12:15

## 2024-02-12 RX ADMIN — SODIUM CHLORIDE, POTASSIUM CHLORIDE, SODIUM LACTATE AND CALCIUM CHLORIDE: 600; 310; 30; 20 INJECTION, SOLUTION INTRAVENOUS at 16:31

## 2024-02-12 RX ADMIN — SODIUM CHLORIDE, POTASSIUM CHLORIDE, SODIUM LACTATE AND CALCIUM CHLORIDE: 600; 310; 30; 20 INJECTION, SOLUTION INTRAVENOUS at 21:36

## 2024-02-12 RX ADMIN — SODIUM CHLORIDE 1000 ML: 9 INJECTION, SOLUTION INTRAVENOUS at 10:16

## 2024-02-12 RX ADMIN — FENTANYL CITRATE 25 MCG: 50 INJECTION, SOLUTION INTRAMUSCULAR; INTRAVENOUS at 17:13

## 2024-02-12 RX ADMIN — FAMOTIDINE 20 MG: 10 INJECTION INTRAVENOUS at 16:42

## 2024-02-12 RX ADMIN — ROCURONIUM BROMIDE 10 MG: 10 INJECTION, SOLUTION INTRAVENOUS at 16:39

## 2024-02-12 RX ADMIN — FENTANYL CITRATE 25 MCG: 50 INJECTION, SOLUTION INTRAMUSCULAR; INTRAVENOUS at 16:39

## 2024-02-12 RX ADMIN — MIDAZOLAM HYDROCHLORIDE 2 MG: 2 INJECTION, SOLUTION INTRAMUSCULAR; INTRAVENOUS at 16:32

## 2024-02-12 RX ADMIN — Medication 10 ML: at 21:40

## 2024-02-12 RX ADMIN — DEXAMETHASONE SODIUM PHOSPHATE 4 MG: 4 INJECTION INTRA-ARTICULAR; INTRALESIONAL; INTRAMUSCULAR; INTRAVENOUS; SOFT TISSUE at 16:42

## 2024-02-12 RX ADMIN — Medication 140 MG: at 16:39

## 2024-02-12 RX ADMIN — LIDOCAINE HYDROCHLORIDE 70 MG: 20 INJECTION, SOLUTION EPIDURAL; INFILTRATION; INTRACAUDAL; PERINEURAL at 16:39

## 2024-02-12 RX ADMIN — PIPERACILLIN AND TAZOBACTAM 4500 MG: 4; .5 INJECTION, POWDER, LYOPHILIZED, FOR SOLUTION INTRAVENOUS at 14:14

## 2024-02-12 RX ADMIN — SODIUM CHLORIDE, PRESERVATIVE FREE 10 ML: 5 INJECTION INTRAVENOUS at 21:38

## 2024-02-12 RX ADMIN — IOPAMIDOL 100 ML: 755 INJECTION, SOLUTION INTRAVENOUS at 12:04

## 2024-02-12 RX ADMIN — SUGAMMADEX 200 MG: 100 INJECTION, SOLUTION INTRAVENOUS at 17:56

## 2024-02-12 RX ADMIN — FENTANYL CITRATE 50 MCG: 50 INJECTION, SOLUTION INTRAMUSCULAR; INTRAVENOUS at 17:45

## 2024-02-12 RX ADMIN — PIPERACILLIN SODIUM AND TAZOBACTAM SODIUM 3375 MG: 3; .375 INJECTION, POWDER, LYOPHILIZED, FOR SOLUTION INTRAVENOUS at 21:39

## 2024-02-12 RX ADMIN — ROCURONIUM BROMIDE 10 MG: 10 INJECTION, SOLUTION INTRAVENOUS at 16:50

## 2024-02-12 RX ADMIN — ONDANSETRON 4 MG: 2 INJECTION INTRAMUSCULAR; INTRAVENOUS at 16:42

## 2024-02-12 RX ADMIN — PIPERACILLIN SODIUM AND TAZOBACTAM SODIUM 3375 MG: 3; .375 INJECTION, POWDER, LYOPHILIZED, FOR SOLUTION INTRAVENOUS at 16:31

## 2024-02-12 RX ADMIN — PROPOFOL 30 MCG/KG/MIN: 10 INJECTION, EMULSION INTRAVENOUS at 16:41

## 2024-02-12 NOTE — H&P
pink and moist  Neck: supple, no masses, no adenopathy or carotid bruits, trachea midline  Resp: clear to auscultation bilaterally, no wheeze, rhonchi or rales, excursions normal and symmetrical  Cardio: Regular rate and rhythm, no murmurs, clicks, gallops or rubs, no edema or varicosities  Abdomen: Tender abdomen.  Neuro: sensation and strength grossly intact and symmetrical  Psych: alert and oriented to person, place and time  Skin: warm and moist.  Hematologic system: No bruising.  Vascular examination: Intact in lower extremity.  Labs:  Recent Results (from the past 24 hour(s))   EKG 12 Lead    Collection Time: 02/12/24  8:26 AM   Result Value Ref Range    Ventricular Rate 94 BPM    Atrial Rate 94 BPM    P-R Interval 146 ms    QRS Duration 84 ms    Q-T Interval 348 ms    QTc Calculation (Bazett) 435 ms    P Axis 37 degrees    R Axis 31 degrees    T Axis 11 degrees    Diagnosis       Normal sinus rhythm  Normal ECG  No previous ECGs available  Confirmed by BARBIE GALVEZ MD ([id number]) on 2/12/2024 10:49:48 AM     CBC with Auto Differential    Collection Time: 02/12/24  8:42 AM   Result Value Ref Range    WBC 21.6 (H) 3.6 - 11.0 K/uL    RBC 4.18 3.80 - 5.20 M/uL    Hemoglobin 11.3 (L) 11.5 - 16.0 g/dL    Hematocrit 36.0 35.0 - 47.0 %    MCV 86.1 80.0 - 99.0 FL    MCH 27.0 26.0 - 34.0 PG    MCHC 31.4 30.0 - 36.5 g/dL    RDW 12.5 11.5 - 14.5 %    Platelets 304 150 - 400 K/uL    MPV 10.7 8.9 - 12.9 FL    Nucleated RBCs 0.0 0.0  WBC    nRBC 0.00 0.00 - 0.01 K/uL    Neutrophils % 80 (H) 32 - 75 %    Lymphocytes % 10 (L) 12 - 49 %    Monocytes % 9 5 - 13 %    Eosinophils % 0 0 - 7 %    Basophils % 0 0 - 1 %    Immature Granulocytes 1 (H) 0 - 0.5 %    Neutrophils Absolute 17.3 (H) 1.8 - 8.0 K/UL    Lymphocytes Absolute 2.2 0.8 - 3.5 K/UL    Monocytes Absolute 1.9 (H) 0.0 - 1.0 K/UL    Eosinophils Absolute 0.0 0.0 - 0.4 K/UL    Basophils Absolute 0.0 0.0 - 0.1 K/UL    Absolute Immature Granulocyte 0.1 (H) 0.00 - 0.04

## 2024-02-12 NOTE — ED NOTES
ED TO INPATIENT SBAR HANDOFF    Patient Name: Nishi Ricardo   Preferred Name: Nishi  : 1995  28 y.o.   Family/Caregiver Present: no   Code Status Order: Full Code  PO Status: NPO:Yes  Telemetry Order:   C-SSRS: Risk of Suicide: No Risk  Sitter no   Restraints:     Sepsis Risk Score Sepsis Risk Score: 2.28    Situation  Chief Complaint   Patient presents with    Abdominal Pain     Brief Description of Patient's Condition: appendicitis and pt had a baby 1 month ago  Mental Status: oriented, alert, coherent, logical, thought processes intact, and able to concentrate and follow conversation  Arrived from:Home  Imaging:   CT ABDOMEN PELVIS W IV CONTRAST Additional Contrast? Radiologist Recommendation   Final Result   Acute appendicitis. No periappendiceal abscess or drainable collection.        Abnormal labs:   Abnormal Labs Reviewed   CBC WITH AUTO DIFFERENTIAL - Abnormal; Notable for the following components:       Result Value    WBC 21.6 (*)     Hemoglobin 11.3 (*)     Neutrophils % 80 (*)     Lymphocytes % 10 (*)     Immature Granulocytes 1 (*)     Neutrophils Absolute 17.3 (*)     Monocytes Absolute 1.9 (*)     Absolute Immature Granulocyte 0.1 (*)     All other components within normal limits   COMPREHENSIVE METABOLIC PANEL - Abnormal; Notable for the following components:    Glucose 143 (*)     Bun/Cre Ratio 8 (*)     AST 7 (*)     Globulin 4.1 (*)     Albumin/Globulin Ratio 0.9 (*)     All other components within normal limits   URINALYSIS WITH REFLEX TO CULTURE - Abnormal; Notable for the following components:    Blood, Urine Moderate (*)     All other components within normal limits       Background  Allergies: No Known Allergies  History:   Past Medical History:   Diagnosis Date    Anxiety        Assessment  Vitals: MEWS Score: 2  Level of Consciousness: Alert (0)   Vitals:    24 1229 24 1245 24 1300 24 1430   BP:   99/63 103/66   Pulse:  94 100 92   Resp:  20 16 17   Temp: 98.1

## 2024-02-12 NOTE — ANESTHESIA PRE PROCEDURE
Department of Anesthesiology  Preprocedure Note       Name:  Nishi Ricardo   Age:  28 y.o.  :  1995                                          MRN:  [id number]         Date:  2024      Surgeon: Surgeon(s):  Everton Raphael MD    Procedure: Procedure(s):  APPENDECTOMY LAPAROSCOPIC    Medications prior to admission:   Prior to Admission medications    Medication Sig Start Date End Date Taking? Authorizing Provider   ondansetron (ZOFRAN-ODT) 4 MG disintegrating tablet Take 1 tablet by mouth every 8 hours as needed for Nausea or Vomiting 24 Yes Tigre Nathan DO   acetaminophen (TYLENOL) 500 MG tablet Take 0.5 tablets by mouth every 6 hours as needed for Pain   Yes Provider, MD Jillian   ibuprofen (ADVIL;MOTRIN) 800 MG tablet Take 1 tablet by mouth every 8 hours as needed for Pain 1/10/24 2/12/24  Hernando Santo MD   Prenatal Vit-Fe Fumarate-FA (PRENATAL VITAMIN) 27-0.8 MG TABS Take 1 tablet by mouth daily 8/15/23   Aide Levy MD       Current medications:    Current Facility-Administered Medications   Medication Dose Route Frequency Provider Last Rate Last Admin    ondansetron (ZOFRAN) injection 4 mg  4 mg IntraVENous Once Tigre Nathan DO        0.9 % sodium chloride infusion   IntraVENous Continuous Tigre Nathan  mL/hr at 24 1416 New Bag at 24 1416    sodium chloride flush 0.9 % injection 5-40 mL  5-40 mL IntraVENous 2 times per day Everton Raphael MD        sodium chloride flush 0.9 % injection 5-40 mL  5-40 mL IntraVENous PRN Everton Raphael MD        0.9 % sodium chloride infusion   IntraVENous PRN Everton Raphael MD        potassium chloride (KLOR-CON M) extended release tablet 40 mEq  40 mEq Oral PRN Everton Raphael MD        Or    potassium bicarb-citric acid (EFFER-K) effervescent tablet 40 mEq  40 mEq Oral PRN Everton Raphael MD        Or    potassium chloride 10 mEq/100 mL IVPB (Peripheral Line)  10 mEq IntraVENous PRN Everton Raphael

## 2024-02-12 NOTE — ED TRIAGE NOTES
Pt complaining of all over abd pain that started 2 days ago, with vomiting and fevers.   Pt did delivery a baby one month ago, 1/10/2024, pt delivery vaginally.     Pt had fever at 5am of 102 and took tylenol

## 2024-02-12 NOTE — ED PROVIDER NOTES
[facility] EMERGENCY DEPT  EMERGENCY DEPARTMENT HISTORY AND PHYSICAL EXAM      Date: 2/12/2024  Patient Name: Nishi Ricardo  MRN: [id number]  Birthdate 1995  Date of evaluation: 2/12/2024  Provider: Tigre Nathan DO   Note Started: 9:05 AM EST 2/12/24    HISTORY OF PRESENT ILLNESS     Chief Complaint   Patient presents with    Abdominal Pain     History Provided By: Patient    HPI: Nishi Ricardo is a 28 y.o. female with past medical history of anxiety  who presents with abdominal pain, nausea, and vomiting.  Symptoms have been present for 3 days.  Pain has been generalized.  Denies any fevers.  No diarrhea.  Patient had a vaginal delivery 1 month ago.  She denies any abnormal vaginal discharge or bleeding.  She did have diagnosis of mastitis and was given antibiotic for a day.  States that pain started after the antibiotic.  She states that the breast pain and mastitis has improved.    PAST MEDICAL HISTORY   Past Medical History:  Past Medical History:   Diagnosis Date    Anxiety        Past Surgical History:  Past Surgical History:   Procedure Laterality Date    EYE SURGERY Bilateral 2019       Family History:  Family History   Problem Relation Age of Onset    No Known Problems Father     No Known Problems Mother        Social History:  Social History     Tobacco Use    Smoking status: Never    Smokeless tobacco: Never   Vaping Use    Vaping Use: Never used   Substance Use Topics    Alcohol use: Never    Drug use: Never       Allergies:  No Known Allergies    PCP: Aide Levy MD    Current Meds:   Current Facility-Administered Medications   Medication Dose Route Frequency Provider Last Rate Last Admin    ondansetron (ZOFRAN) injection 4 mg  4 mg IntraVENous Once Tigre Nathan DO        piperacillin-tazobactam (ZOSYN) 4,500 mg in sodium chloride 0.9 % 100 mL IVPB (mini-bag)  4,500 mg IntraVENous Once Tigre Nathan DO        0.9 % sodium chloride infusion   IntraVENous Continuous Tigre Nathan DO

## 2024-02-13 VITALS
HEIGHT: 61 IN | HEART RATE: 72 BPM | OXYGEN SATURATION: 100 % | WEIGHT: 164.68 LBS | BODY MASS INDEX: 31.09 KG/M2 | DIASTOLIC BLOOD PRESSURE: 76 MMHG | SYSTOLIC BLOOD PRESSURE: 109 MMHG | TEMPERATURE: 97.5 F | RESPIRATION RATE: 18 BRPM

## 2024-02-13 PROCEDURE — 6360000002 HC RX W HCPCS: Performed by: SURGERY

## 2024-02-13 PROCEDURE — 2580000003 HC RX 258: Performed by: SURGERY

## 2024-02-13 PROCEDURE — 6370000000 HC RX 637 (ALT 250 FOR IP): Performed by: SURGERY

## 2024-02-13 PROCEDURE — 96365 THER/PROPH/DIAG IV INF INIT: CPT

## 2024-02-13 PROCEDURE — G0378 HOSPITAL OBSERVATION PER HR: HCPCS

## 2024-02-13 RX ORDER — CEPHALEXIN 500 MG/1
500 CAPSULE ORAL 4 TIMES DAILY
Qty: 28 CAPSULE | Refills: 0 | Status: SHIPPED | OUTPATIENT
Start: 2024-02-13 | End: 2024-02-18

## 2024-02-13 RX ADMIN — SODIUM CHLORIDE: 9 INJECTION, SOLUTION INTRAVENOUS at 04:28

## 2024-02-13 RX ADMIN — ACETAMINOPHEN 650 MG: 325 TABLET ORAL at 04:28

## 2024-02-13 RX ADMIN — PIPERACILLIN SODIUM AND TAZOBACTAM SODIUM 3375 MG: 3; .375 INJECTION, POWDER, LYOPHILIZED, FOR SOLUTION INTRAVENOUS at 04:28

## 2024-02-13 RX ADMIN — ACETAMINOPHEN 650 MG: 325 TABLET ORAL at 11:45

## 2024-02-13 NOTE — OP NOTE
Operative Note      Patient: Nishi Ricardo  YOB: 1995  MRN: [id number]    Date of Procedure: 2/12/2024    Pre-Op Diagnosis Codes:     * Appendicitis, unspecified appendicitis type [K37]    Post-Op Diagnosis:  Suppurative appendicitis, cecum wrapped around the appendix       Procedure(s):  APPENDECTOMY LAPAROSCOPIC    Surgeon(s):  Everton Raphael MD    Assistant:   Surgical Assistant: Jan Constantino    Anesthesia: General    Estimated Blood Loss (mL): Minimal    Complications: None    Specimens:   ID Type Source Tests Collected by Time Destination   1 : appendix Tissue Appendix SURGICAL PATHOLOGY Everton Raphael MD 2/12/2024 1722        Implants:  * No implants in log *      Drains: * No LDAs found *    Findings: As above        Detailed Description of Procedure:   Description of procedure: Patient was brought to the operating table comfortable supine position followed by general anesthesia was initiated.  Followed by abdomen was prepped usual sterile technique using DuraPrep's followed by sterile drapes applied usual fashion.  At this time timeout was called after confirmation was made procedure commenced.  I used 1% lidocaine for local anesthetics.  Followed by small supraumbilical incision was made using #15 blade, followed by using Salinas technique the abdomen was entered with any difficulty, 10 to 12 mm Salinas trochars placed usual fashion under direct visualization.  Followed by pneumoperitoneum was insufflated up to 15 ervin followed by additional 5 mm trochars placed suprapubic and left lower quadrant area under direct visualization.  Followed by patient's appropriate positions with Trendelenburg position with rotation to the patient's left for visceral retraction.  Followed by right lower quadrant area was visualized.  And ileocecal complex capital mobilized by taking down the retroperitoneal attachments.  Appendix appears to be left with cecal wall.  Epinephrine carefully .  Appendix was

## 2024-02-13 NOTE — CARE COORDINATION
Patient with discharge orders today, self care. In observation status.    Transition of Care Plan:    RUR: 8%  Prior Level of Functioning: ind  Disposition: home  If SNF or IPR: Date FOC offered: na  Date FOC received: na  Accepting facility: na  Date authorization started with reference number: na  Date authorization received and expires: na  Follow up appointments:   DME needed: na  Transportation at discharge: friend/family  IM/IMM Medicare/ letter given: na  Is patient a  and connected with VA? na  If yes, was  transfer form completed and VA notified? na  Caregiver Contact: na  Discharge Caregiver contacted prior to discharge? na  Care Conference needed? na  Barriers to discharge: na

## 2024-02-13 NOTE — PROGRESS NOTES
Patient discharged from facility this shift. Patient received discharge instructions and education on prescriptions and follow up appt.from discharge nurse. No LDAs present on patient at time of discharge. Patient with family member escorted to personal vehicle by discharge nurse via wheelchair.

## 2024-02-13 NOTE — PROGRESS NOTES
Patient transferred via stretcher in stable condition.  Bedside report given, SBAR reviewed, sites viewed. Patients family and belongings at the bedside.

## 2024-02-13 NOTE — DISCHARGE INSTR - COC
Continuity of Care Form    Patient Name: Nishi Ricardo   :  1995  MRN:  [id number]    Admit date:  2024  Discharge date:  2024    Code Status Order: Full Code   Advance Directives:   Advance Care Flowsheet Documentation       Date/Time Healthcare Directive Type of Healthcare Directive Copy in Chart Healthcare Agent Appointed Healthcare Agent's Name Healthcare Agent's Phone Number    24 1618 No, patient does not have an advance directive for healthcare treatment -- -- -- -- --            Admitting Physician:  Everton Raphael MD  PCP: Aide Levy MD    Discharging Nurse: GLO Miller RN  Discharging Hospital Unit/Room#: 529/01  Discharging Unit Phone Number: [phone]    Emergency Contact:   Extended Emergency Contact Information  [facility]  Home Phone: [phone]  Mobile Phone: [phone]  Relation: Other Relative    Past Surgical History:  Past Surgical History:   Procedure Laterality Date    EYE SURGERY Bilateral 2019    LAPAROSCOPIC APPENDECTOMY N/A 2024    APPENDECTOMY LAPAROSCOPIC performed by Everton Raphael MD at [facility] MAIN OR       Immunization History:   There is no immunization history for the selected administration types on file for this patient.    Active Problems:  Patient Active Problem List   Diagnosis Code     (normal spontaneous vaginal delivery) O80    Second degree perineal laceration during delivery O70.1    Acute appendicitis K35.80       Isolation/Infection:   Isolation            No Isolation          Patient Infection Status       None to display                     Nurse Assessment:  Last Vital Signs: /76   Pulse 72   Temp 97.5 °F (36.4 °C)   Resp 18   Ht 1.549 m (5' 1\")   Wt 74.7 kg (164 lb 10.9 oz)   SpO2 100%   BMI 31.12 kg/m²     Last documented pain score (0-10 scale): Pain Level: 3  Last Weight:   Wt Readings from Last 1 Encounters:   24

## 2024-02-13 NOTE — BRIEF OP NOTE
Brief Postoperative Note      Patient: Nishi Ricardo  YOB: 1995  MRN: [id number]    Date of Procedure: 2/12/2024    Pre-Op Diagnosis Codes:     * Appendicitis, unspecified appendicitis type [K37]    Post-Op Diagnosis:  Acute suppurative appendicitis cecum was wrapped around appendix.       Procedure(s):  APPENDECTOMY LAPAROSCOPIC    Surgeon(s):  Everton Raphael MD    Assistant:  Surgical Assistant: Jan Constantino    Anesthesia: General    Estimated Blood Loss (mL): Minimal    Complications: None    Specimens:   ID Type Source Tests Collected by Time Destination   1 : appendix Tissue Appendix SURGICAL PATHOLOGY Everton Raphael MD 2/12/2024 1722        Implants:  * No implants in log *      Drains: * No LDAs found *    Findings: As above      Electronically signed by Donavon Toscano MD on 2/13/2024 at 2:58 AM

## 2024-02-13 NOTE — PROGRESS NOTES
Spoke with provider Dr. Raphael and stated new read back order  for regular diet and to discontinue Liquid diet.

## 2024-02-13 NOTE — PROGRESS NOTES
4 Eyes Skin Assessment     NAME:  Nishi Ricardo  YOB: 1995  MEDICAL RECORD NUMBER:  [id number]    The patient is being assessed for  Admission    I agree that at least one RN has performed a thorough Head to Toe Skin Assessment on the patient. ALL assessment sites listed below have been assessed.      Areas assessed by both nurses:    Head, Face, Ears, Shoulders, Back, Chest, Arms, Elbows, Hands, Sacrum. Buttock, Coccyx, Ischium, Legs. Feet and Heels, and Under Medical Devices         Does the Patient have a Wound? No noted wound(s)       Orion Prevention initiated by RN: Yes  Wound Care Orders initiated by RN: No    Pressure Injury (Stage 3,4, Unstageable, DTI, NWPT, and Complex wounds) if present, place Wound referral order by RN under : No    New Ostomies, if present place, Ostomy referral order under : No     Nurse 1 eSignature: Electronically signed by Natalee Galloway RN on 2/13/24 at 6:53 AM EST    **SHARE this note so that the co-signing nurse can place an eSignature**    Nurse 2 eSignature: Electronically signed by Delia Mckeon RN on 2/13/24 at 7:00 AM EST

## 2024-02-17 NOTE — ANESTHESIA POSTPROCEDURE EVALUATION
Department of Anesthesiology  Postprocedure Note    Patient: Nishi Ricardo  MRN: [id number]  YOB: 1995    Procedure Summary       Date: 02/12/24 Room / Location: [facility] MAIN OR 02 / [facility] MAIN OR    Anesthesia Start: 1631 Anesthesia Stop: 1808    Procedure: APPENDECTOMY LAPAROSCOPIC (Abdomen) Diagnosis:       Appendicitis, unspecified appendicitis type      (Appendicitis, unspecified appendicitis type [K37])    Surgeons: Everton Raphael MD Responsible Provider: Rufus Daniel MD    Anesthesia Type: General ASA Status: 1 - Emergent            Anesthesia Type: General    Milagros Phase I: Milagros Score: 10    Milagros Phase II:      Anesthesia Post Evaluation    Patient location during evaluation: PACU  Patient participation: complete - patient cannot participate  Level of consciousness: awake  Pain score: 0  Airway patency: patent  Nausea & Vomiting: no nausea and no vomiting  Cardiovascular status: hemodynamically stable  Respiratory status: acceptable  Hydration status: stable  Multimodal analgesia pain management approach        No notable events documented.

## 2024-02-26 ENCOUNTER — OFFICE VISIT (OUTPATIENT)
Age: 29
End: 2024-02-26

## 2024-02-26 VITALS
SYSTOLIC BLOOD PRESSURE: 111 MMHG | OXYGEN SATURATION: 98 % | BODY MASS INDEX: 30.87 KG/M2 | WEIGHT: 163.5 LBS | DIASTOLIC BLOOD PRESSURE: 76 MMHG | RESPIRATION RATE: 16 BRPM | TEMPERATURE: 98.5 F | HEART RATE: 72 BPM | HEIGHT: 61 IN

## 2024-02-26 DIAGNOSIS — K35.30 ACUTE APPENDICITIS WITH LOCALIZED PERITONITIS, WITHOUT PERFORATION, ABSCESS, OR GANGRENE: Primary | ICD-10-CM

## 2024-02-26 PROCEDURE — 99024 POSTOP FOLLOW-UP VISIT: CPT | Performed by: SURGERY

## 2024-02-26 ASSESSMENT — PATIENT HEALTH QUESTIONNAIRE - PHQ9
SUM OF ALL RESPONSES TO PHQ QUESTIONS 1-9: 0
1. LITTLE INTEREST OR PLEASURE IN DOING THINGS: 0
SUM OF ALL RESPONSES TO PHQ QUESTIONS 1-9: 0
SUM OF ALL RESPONSES TO PHQ QUESTIONS 1-9: 0
SUM OF ALL RESPONSES TO PHQ9 QUESTIONS 1 & 2: 0
SUM OF ALL RESPONSES TO PHQ QUESTIONS 1-9: 0
2. FEELING DOWN, DEPRESSED OR HOPELESS: 0

## 2024-02-26 NOTE — PROGRESS NOTES
Identified pt with two pt identifiers (name and ). Reviewed chart in preparation for visit and have obtained necessary documentation.    Nishi Ricardo is a 28 y.o. female  Chief Complaint   Patient presents with    Follow-up     hospital visit follow up DOS 24; Acute appendicitis     /76 (Site: Left Upper Arm, Position: Sitting, Cuff Size: Small Adult)   Pulse 72   Temp 98.5 °F (36.9 °C) (Temporal)   Resp 16   Ht 1.549 m (5' 1\")   Wt 74.2 kg (163 lb 8 oz)   SpO2 98%   BMI 30.89 kg/m²     1. Have you been to the ER, urgent care clinic since your last visit?  Hospitalized since your last visit?no    2. Have you seen or consulted any other health care providers outside of the [facility] since your last visit?  Include any pap smears or colon screening. no

## 2024-03-01 NOTE — PROGRESS NOTES
PROGRESS NOTE      Chief Complaints:  Follow up appndectomy  HPI and  Objective:    Pt is doing well, no fever or pain  Review of Systems:  negative  EXAM:  /76 (Site: Left Upper Arm, Position: Sitting, Cuff Size: Small Adult)   Pulse 72   Temp 98.5 °F (36.9 °C) (Temporal)   Resp 16   Ht 1.549 m (5' 1\")   Wt 74.2 kg (163 lb 8 oz)   SpO2 98%   BMI 30.89 kg/m²     Patient is awake   Head and neck atraumatic, normocephalic.  ENT: No hoarse voice  Cardiac system regular rate rhythm.  Pulmonary no audible wheeze  Chest wall excursion normal with respiration cycle  Abdomen is soft not particularly distended.  Neurologically nonfocal.  Skin is warm and moist.  Psychosocial: Cooperative.  Vascular examination as previously noted no changes.    Current Outpatient Medications on File Prior to Visit   Medication Sig Dispense Refill    Prenatal Vit-Fe Fumarate-FA (PRENATAL VITAMIN) 27-0.8 MG TABS Take 1 tablet by mouth daily 90 tablet 5     No current facility-administered medications on file prior to visit.       No results found for this or any previous visit (from the past 24 hour(s)).    ASSESSMENT:   Patient is 28 y.o. with diagnosis of : Active Problems:    * No active hospital problems. *  Resolved Problems:    * No resolved hospital problems. *      PLAN:                 Pt is doing well post op. Path discussed. Pt will be discharged from surgery and f/u PRN

## (undated) DEVICE — SOUTHSIDE TURNOVER: Brand: MEDLINE INDUSTRIES, INC.

## (undated) DEVICE — BLADE,CARBON-STEEL,15,STRL,DISPOSABLE,TB: Brand: MEDLINE

## (undated) DEVICE — PUMP SUC IRR TBNG L10FT W/ HNDPC ASSEMB STRYKEFLOW 2

## (undated) DEVICE — GARMENT,MEDLINE,DVT,INT,CALF,MED, GEN2: Brand: MEDLINE

## (undated) DEVICE — APPLIER CLP M/L SHFT DIA5MM 15 LIG LIGAMAX 5

## (undated) DEVICE — TROCARS: Brand: KII® BALLOON BLUNT TIP SYSTEM

## (undated) DEVICE — SOLUTION IRRIG 500ML 0.9% SOD CHLO USP POUR PLAS BTL

## (undated) DEVICE — BANDAGE ADH W2XL4IN NAT PLAS W/ AIR H FOR WND PROTCT CURAD

## (undated) DEVICE — SUTURE ABSORBABLE MONOFILAMENT 3-0 SH 27 IN UD MONOCRYL + MCP316H

## (undated) DEVICE — Device

## (undated) DEVICE — DRAIN SURG W7XL20CM SIL SMOOTH FLAT 3/4 PERF DBL WRP

## (undated) DEVICE — APPLICATOR MEDICATED 26 CC SOLUTION HI LT ORNG CHLORAPREP

## (undated) DEVICE — LAPAROSCOPIC SCISSORS: Brand: EPIX LAPAROSCOPIC SCISSORS

## (undated) DEVICE — BANDAGE ADH W1XL3IN NAT FAB WVN FLX DURABLE N ADH PD SEAL

## (undated) DEVICE — TROCAR: Brand: KII FIOS FIRST ENTRY

## (undated) DEVICE — SUTURE PERMAHAND SZ 3-0 L30IN NONABSORBABLE BLK SH L26MM K832H

## (undated) DEVICE — GLOVE ORANGE PI 7 1/2   MSG9075

## (undated) DEVICE — TROCAR: Brand: KII® SLEEVE

## (undated) DEVICE — LAPAROSCOPIC CHOLE PACK: Brand: MEDLINE INDUSTRIES, INC.

## (undated) DEVICE — MASTISOL ADHESIVE LIQ 2/3ML

## (undated) DEVICE — HYPODERMIC SAFETY NEEDLE: Brand: MONOJECT

## (undated) DEVICE — SUTURE SZ 0 27IN 5/8 CIR UR-6  TAPER PT VIOLET ABSRB VICRYL J603H

## (undated) DEVICE — E-Z CLEAN, PTFE COATED, ELECTROSURGICAL LAPAROSCOPIC ELECTRODE, L-HOOK, 33 CM., SINGLE-USE, FOR USE WITH HAND CONTROL PENCIL: Brand: MEGADYNE

## (undated) DEVICE — TISSUE RETRIEVAL SYSTEM: Brand: INZII RETRIEVAL SYSTEM

## (undated) DEVICE — KIT,ANTI FOG,W/SPONGE & FLUID,SOFT PACK: Brand: MEDLINE

## (undated) DEVICE — 3M™ STERI-STRIP™ REINFORCED ADHESIVE SKIN CLOSURES, R1547, 1/2 IN X 4 IN (12 MM X 100 MM), 6 STRIPS/ENVELOPE: Brand: 3M™ STERI-STRIP™

## (undated) DEVICE — SOLUTION IV 1000ML 0.9% SOD CHL PH 5 INJ USP VIAFLX PLAS

## (undated) DEVICE — TUBING INSUFFLATOR HEAT HUMIDIFIED SMK EVAC SET PNEUMOCLEAR

## (undated) DEVICE — STAPLER INT L340MM 45MM STD 12 FIRING B FRM PWR + GRIPPING

## (undated) DEVICE — RELOAD STPL L45MM H1.5-3.6MM REG TISS BLU GRIPPING SURF B

## (undated) DEVICE — SUTURE MCRYL + SZ 4-0 L27IN ABSRB UD L19MM PS-2 3/8 CIR MCP426H